# Patient Record
Sex: MALE | Race: WHITE | URBAN - METROPOLITAN AREA
[De-identification: names, ages, dates, MRNs, and addresses within clinical notes are randomized per-mention and may not be internally consistent; named-entity substitution may affect disease eponyms.]

---

## 2017-05-04 ENCOUNTER — OFFICE VISIT (OUTPATIENT)
Dept: INTERNAL MEDICINE | Facility: CLINIC | Age: 37
End: 2017-05-04
Payer: COMMERCIAL

## 2017-05-04 VITALS
HEART RATE: 82 BPM | OXYGEN SATURATION: 97 % | DIASTOLIC BLOOD PRESSURE: 84 MMHG | WEIGHT: 225 LBS | SYSTOLIC BLOOD PRESSURE: 126 MMHG | BODY MASS INDEX: 30.48 KG/M2 | HEIGHT: 72 IN | TEMPERATURE: 98.6 F

## 2017-05-04 DIAGNOSIS — R42 DIZZINESS: ICD-10-CM

## 2017-05-04 DIAGNOSIS — H81.11 BPPV (BENIGN PAROXYSMAL POSITIONAL VERTIGO), RIGHT: ICD-10-CM

## 2017-05-04 DIAGNOSIS — Z13.6 CARDIOVASCULAR SCREENING; LDL GOAL LESS THAN 130: ICD-10-CM

## 2017-05-04 DIAGNOSIS — M41.9 SCOLIOSIS, UNSPECIFIED SCOLIOSIS TYPE, UNSPECIFIED SPINAL REGION: ICD-10-CM

## 2017-05-04 DIAGNOSIS — Z00.01 ENCOUNTER FOR GENERAL ADULT MEDICAL EXAMINATION WITH ABNORMAL FINDINGS: Primary | ICD-10-CM

## 2017-05-04 LAB
ALBUMIN SERPL-MCNC: 4.3 G/DL (ref 3.4–5)
ALP SERPL-CCNC: 68 U/L (ref 40–150)
ALT SERPL W P-5'-P-CCNC: 37 U/L (ref 0–70)
ANION GAP SERPL CALCULATED.3IONS-SCNC: 8 MMOL/L (ref 3–14)
AST SERPL W P-5'-P-CCNC: 23 U/L (ref 0–45)
BASOPHILS # BLD AUTO: 0.1 10E9/L (ref 0–0.2)
BASOPHILS NFR BLD AUTO: 0.9 %
BILIRUB SERPL-MCNC: 0.5 MG/DL (ref 0.2–1.3)
BUN SERPL-MCNC: 17 MG/DL (ref 7–30)
CALCIUM SERPL-MCNC: 9.1 MG/DL (ref 8.5–10.1)
CHLORIDE SERPL-SCNC: 105 MMOL/L (ref 94–109)
CHOLEST SERPL-MCNC: 198 MG/DL
CO2 SERPL-SCNC: 27 MMOL/L (ref 20–32)
CREAT SERPL-MCNC: 1.15 MG/DL (ref 0.66–1.25)
DIFFERENTIAL METHOD BLD: NORMAL
EOSINOPHIL # BLD AUTO: 0.3 10E9/L (ref 0–0.7)
EOSINOPHIL NFR BLD AUTO: 4.6 %
ERYTHROCYTE [DISTWIDTH] IN BLOOD BY AUTOMATED COUNT: 13.2 % (ref 10–15)
GFR SERPL CREATININE-BSD FRML MDRD: 72 ML/MIN/1.7M2
GLUCOSE SERPL-MCNC: 92 MG/DL (ref 70–99)
HCT VFR BLD AUTO: 45.8 % (ref 40–53)
HDLC SERPL-MCNC: 60 MG/DL
HGB BLD-MCNC: 15.6 G/DL (ref 13.3–17.7)
LDLC SERPL CALC-MCNC: 118 MG/DL
LYMPHOCYTES # BLD AUTO: 1.9 10E9/L (ref 0.8–5.3)
LYMPHOCYTES NFR BLD AUTO: 29.2 %
MCH RBC QN AUTO: 30.2 PG (ref 26.5–33)
MCHC RBC AUTO-ENTMCNC: 34.1 G/DL (ref 31.5–36.5)
MCV RBC AUTO: 89 FL (ref 78–100)
MONOCYTES # BLD AUTO: 0.6 10E9/L (ref 0–1.3)
MONOCYTES NFR BLD AUTO: 9.9 %
NEUTROPHILS # BLD AUTO: 3.5 10E9/L (ref 1.6–8.3)
NEUTROPHILS NFR BLD AUTO: 55.4 %
NONHDLC SERPL-MCNC: 138 MG/DL
PLATELET # BLD AUTO: 293 10E9/L (ref 150–450)
POTASSIUM SERPL-SCNC: 4.2 MMOL/L (ref 3.4–5.3)
PROT SERPL-MCNC: 8 G/DL (ref 6.8–8.8)
RBC # BLD AUTO: 5.17 10E12/L (ref 4.4–5.9)
SODIUM SERPL-SCNC: 140 MMOL/L (ref 133–144)
TRIGL SERPL-MCNC: 102 MG/DL
TSH SERPL DL<=0.005 MIU/L-ACNC: 1.45 MU/L (ref 0.4–4)
VIT B12 SERPL-MCNC: 589 PG/ML (ref 193–986)
WBC # BLD AUTO: 6.4 10E9/L (ref 4–11)

## 2017-05-04 PROCEDURE — 99385 PREV VISIT NEW AGE 18-39: CPT | Performed by: NURSE PRACTITIONER

## 2017-05-04 PROCEDURE — 80061 LIPID PANEL: CPT | Performed by: NURSE PRACTITIONER

## 2017-05-04 PROCEDURE — 82306 VITAMIN D 25 HYDROXY: CPT | Performed by: NURSE PRACTITIONER

## 2017-05-04 PROCEDURE — 80050 GENERAL HEALTH PANEL: CPT | Performed by: NURSE PRACTITIONER

## 2017-05-04 PROCEDURE — 36415 COLL VENOUS BLD VENIPUNCTURE: CPT | Performed by: NURSE PRACTITIONER

## 2017-05-04 PROCEDURE — 82607 VITAMIN B-12: CPT | Performed by: NURSE PRACTITIONER

## 2017-05-04 ASSESSMENT — ANXIETY QUESTIONNAIRES
7. FEELING AFRAID AS IF SOMETHING AWFUL MIGHT HAPPEN: SEVERAL DAYS
3. WORRYING TOO MUCH ABOUT DIFFERENT THINGS: SEVERAL DAYS
5. BEING SO RESTLESS THAT IT IS HARD TO SIT STILL: NOT AT ALL
2. NOT BEING ABLE TO STOP OR CONTROL WORRYING: NOT AT ALL
1. FEELING NERVOUS, ANXIOUS, OR ON EDGE: NOT AT ALL
IF YOU CHECKED OFF ANY PROBLEMS ON THIS QUESTIONNAIRE, HOW DIFFICULT HAVE THESE PROBLEMS MADE IT FOR YOU TO DO YOUR WORK, TAKE CARE OF THINGS AT HOME, OR GET ALONG WITH OTHER PEOPLE: SOMEWHAT DIFFICULT
GAD7 TOTAL SCORE: 3
6. BECOMING EASILY ANNOYED OR IRRITABLE: SEVERAL DAYS

## 2017-05-04 ASSESSMENT — PATIENT HEALTH QUESTIONNAIRE - PHQ9: 5. POOR APPETITE OR OVEREATING: NOT AT ALL

## 2017-05-04 NOTE — PROGRESS NOTES
SUBJECTIVE:     CC: Bj Benitez is an 36 year old male who presents for preventative health visit.     Healthy Habits:    Do you get at least three servings of calcium containing foods daily (dairy, green leafy vegetables, etc.)? yes    Amount of exercise or daily activities, outside of work: 5 day(s) per week    Problems taking medications regularly not applicable    Medication side effects: No    Have you had an eye exam in the past two years? Yes x 2 months ago    Do you see a dentist twice per year? Yes x 2 weeks ago    Do you have sleep apnea, excessive snoring or daytime drowsiness?yes some snoring    Has had some dizziness with head turning   Sinus issues at times    PHQ-9 SCORE 5/4/2017   Total Score 5     GENIA-7 SCORE 5/4/2017   Total Score 3     Wife was concerned about his mood  He is a  and there were 2 specific issues that are now resolving he was dealing with. He feels ok now  Does not feel he needs any medication or counseling  Discussed both        Today's PHQ-2 Score:   PHQ-2 ( 1999 [employer]) 5/4/2017   Q1: Little interest or pleasure in doing things 0   Q2: Feeling down, depressed or hopeless 0   PHQ-2 Score 0       Abuse: Current or Past(Physical, Sexual or Emotional)- No  Do you feel safe in your environment - Yes    Social History   Substance Use Topics     Smoking status: Never Smoker     Smokeless tobacco: Not on file     Alcohol use No     The patient does not drink >3 drinks per day nor >7 drinks per week.    Last PSA: No results found for: PSA    No results for input(s): CHOL, HDL, LDL, TRIG, CHOLHDLRATIO, NHDL in the last 47358 hours.    Reviewed orders with patient. Reviewed health maintenance and updated orders accordingly - Yes    Reviewed and updated as needed this visit by clinical staff  Tobacco  Allergies  Meds  Problems  Med Hx  Surg Hx  Fam Hx  Soc Hx          Reviewed and updated as needed this visit by Provider  Allergies  Meds  Problems            ROS:  C:  "NEGATIVE for fever, chills, change in weight  I: NEGATIVE for worrisome rashes, moles or lesions  E: NEGATIVE for vision changes or irritation  ENT: NEGATIVE for ear, mouth and throat problems  R: NEGATIVE for significant cough or SOB  CV: NEGATIVE for chest pain, palpitations or peripheral edema  GI: NEGATIVE for nausea, abdominal pain, heartburn, or change in bowel habits   male: negative for dysuria, hematuria, decreased urinary stream, erectile dysfunction, urethral discharge  M: NEGATIVE for significant arthralgias or myalgia  N: NEGATIVE for weakness, dizziness or paresthesias  P: NEGATIVE for changes in mood or affect    Problem list, Medication list, Allergies, and Medical/Social/Surgical histories reviewed in [facility] and updated as appropriate.  OBJECTIVE:     /84 (BP Location: Right arm, Patient Position: Chair, Cuff Size: Adult Large)  Pulse 82  Temp 98.6  F (37  C) (Oral)  Ht 5' 11.5\" (1.816 m)  Wt 225 lb (102.1 kg)  SpO2 97%  BMI 30.94 kg/m2  EXAM:  GENERAL: alert and no distress  EYES: Eyes grossly normal to inspection,  and conjunctivae and sclerae normal  HENT: ear canals and TM's normal on left and dull on right ; with head turn to right has some mild dizziness , nose and mouth without ulcers or lesions  NECK: no adenopathy, no asymmetry, masses, or scars and thyroid normal to palpation  RESP: lungs clear to auscultation - no rales, rhonchi or wheezes  CV: regular rate and rhythm, normal S1 S2, no S3 or S4, no murmur, click or rub, no peripheral edema and peripheral pulses strong  ABDOMEN: soft, nontender, no hepatosplenomegaly, no masses and bowel sounds normal   (male): normal male genitalia without lesions or urethral discharge, no hernia  MS: scoliosis of back; not new   SKIN: no suspicious lesions or rashes  NEURO: Normal strength and tone, mentation intact and speech normal  PSYCH: mentation appears normal, affect normal/bright    ASSESSMENT/PLAN:         ICD-10-CM    1. Encounter " "for general adult medical examination with abnormal findings Z00.01 Lipid panel reflex to direct LDL     Comprehensive metabolic panel     TSH with free T4 reflex     CBC with platelets differential     Vitamin D Deficiency     Vitamin D Deficiency   2. Dizziness R42 TSH with free T4 reflex     CBC with platelets differential     Vitamin B12     Vitamin D Deficiency     Vitamin D Deficiency   3. BPPV (benign paroxysmal positional vertigo), right H81.11    4. Scoliosis, unspecified scoliosis type, unspecified spinal region M41.9    5. CARDIOVASCULAR SCREENING; LDL GOAL LESS THAN 130 Z13.6        COUNSELING:  Reviewed preventive health counseling, as reflected in patient instructions       Regular exercise       Healthy diet/nutrition       Osteoporosis Prevention/Bone Health         reports that he has never smoked. He does not have any smokeless tobacco history on file.    Estimated body mass index is 30.94 kg/(m^2) as calculated from the following:    Height as of this encounter: 5' 11.5\" (1.816 m).    Weight as of this encounter: 225 lb (102.1 kg).   Weight management plan: Discussed healthy diet and exercise guidelines and patient will follow up in 12 months in clinic to re-evaluate.    Counseling Resources:  ATP IV Guidelines  Pooled Cohorts Equation Calculator  FRAX Risk Assessment  ICSI Preventive Guidelines  Dietary Guidelines for Americans, 2010  USDA's MyPlate  ASA Prophylaxis  Lung CA Screening    MAGY Meza [facility]  "

## 2017-05-04 NOTE — NURSING NOTE
"Chief Complaint   Patient presents with     Physical     new pt, fasting       Initial /84 (BP Location: Right arm, Patient Position: Chair, Cuff Size: Adult Large)  Pulse 82  Temp 98.6  F (37  C) (Oral)  Ht 5' 11.5\" (1.816 m)  Wt 225 lb (102.1 kg)  SpO2 97%  BMI 30.94 kg/m2 Estimated body mass index is 30.94 kg/(m^2) as calculated from the following:    Height as of this encounter: 5' 11.5\" (1.816 m).    Weight as of this encounter: 225 lb (102.1 kg).  Medication Reconciliation: complete    "

## 2017-05-04 NOTE — MR AVS SNAPSHOT
After Visit Summary   5/4/2017    Bj Benitez    MRN: [id number]           Patient Information     Date Of Birth          1980        Visit Information        Provider Department      5/4/2017 7:40 AM Floresita Narayanan APRN [facility]        Today's Diagnoses     Encounter for general adult medical examination with abnormal findings    -  1    Dizziness          Care Instructions    Labs in [address]    Be careful with head turn to right     Meclizine 12.5-25 mg every 6 hours as needed for dizziness      If symptoms persist would have you do vestibular rehab     Preventive Health Recommendations  Male Ages 26 - 39    Yearly exam:             See your health care provider every year in order to  o   Review health changes.   o   Discuss preventive care.    o   Review your medicines if your doctor has prescribed any.    You should be tested each year for STDs (sexually transmitted diseases), if you re at risk.     After age 35, talk to your provider about cholesterol testing. If you are at risk for heart disease, have your cholesterol tested at least every 5 years.     If you are at risk for diabetes, you should have a diabetes test (fasting glucose).  Shots: Get a flu shot each year. Get a tetanus shot every 10 years.     Nutrition:    Eat at least 5 servings of fruits and vegetables daily.     Eat whole-grain bread, whole-wheat pasta and brown rice instead of white grains and rice.     Talk to your provider about Calcium and Vitamin D.     Lifestyle    Exercise for at least 150 minutes a week (30 minutes a day, 5 days a week). This will help you control your weight and prevent disease.     Limit alcohol to one drink per day.     No smoking.     Wear sunscreen to prevent skin cancer.     See your dentist every six months for an exam and cleaning.           Follow-ups after your visit        Future tests that were ordered for you today     Open Future Orders        Priority  "Expected Expires Ordered    Vitamin D Deficiency Routine  2018            Who to contact     If you have questions or need follow up information about today's clinic visit or your schedule please contact [facility] directly at [phone].  Normal or non-critical lab and imaging results will be communicated to you by MyChart, letter or phone within 4 business days after the clinic has received the results. If you do not hear from us within 7 days, please contact the clinic through [employer] or phone. If you have a critical or abnormal lab result, we will notify you by phone as soon as possible.  Submit refill requests through [employer] or call your pharmacy and they will forward the refill request to us. Please allow 3 business days for your refill to be completed.          Additional Information About Your Visit        My[employer]See[employer] Information     [employer] lets you send messages to your doctor, view your test results, renew your prescriptions, schedule appointments and more. To sign up, go to [email or web address] . Click on \"Log in\" on the left side of the screen, which will take you to the Welcome page. Then click on \"Sign up Now\" on the right side of the page.     You will be asked to enter the access code listed below, as well as some personal information. Please follow the directions to create your username and password.     Your access code is: [email or web address]  Expires: 2017  9:07 AM     Your access code will  in 90 days. If you need help or a new code, please call your [facility] clinic or [phone].        Care EveryWhere ID     This is your Care EveryWhere ID. This could be used by other organizations to access your [facility] medical records  [id number]        Your Vitals Were     Pulse Temperature Height Pulse Oximetry BMI (Body Mass Index)       82 98.6  F (37  C) (Oral) 5' 11.5\" (1.816 m) 97% 30.94 kg/m2        Blood Pressure from Last 3 Encounters:   17 126/84    " Weight from Last 3 Encounters:   05/04/17 225 lb (102.1 kg)              We Performed the Following     CBC with platelets differential     Comprehensive metabolic panel     Lipid panel reflex to direct LDL     TSH with free T4 reflex     Vitamin B12     Vitamin D Deficiency        Primary Care Provider    Physician No Ref-Primary       No address on file        Thank you!     Thank you for choosing [facility]  for your care. Our goal is always to provide you with excellent care. Hearing back from our patients is one way we can continue to improve our services. Please take a few minutes to complete the written survey that you may receive in the mail after your visit with us. Thank you!             Your Updated Medication List - Protect others around you: Learn how to safely use, store and throw away your medicines at www.disposemymeds.org.      Notice  As of 5/4/2017  9:07 AM    You have not been prescribed any medications.

## 2017-05-04 NOTE — PATIENT INSTRUCTIONS
Labs in [address]    Be careful with head turn to right     Meclizine 12.5-25 mg every 6 hours as needed for dizziness      If symptoms persist would have you do vestibular rehab     Preventive Health Recommendations  Male Ages 26 - 39    Yearly exam:             See your health care provider every year in order to  o   Review health changes.   o   Discuss preventive care.    o   Review your medicines if your doctor has prescribed any.    You should be tested each year for STDs (sexually transmitted diseases), if you re at risk.     After age 35, talk to your provider about cholesterol testing. If you are at risk for heart disease, have your cholesterol tested at least every 5 years.     If you are at risk for diabetes, you should have a diabetes test (fasting glucose).  Shots: Get a flu shot each year. Get a tetanus shot every 10 years.     Nutrition:    Eat at least 5 servings of fruits and vegetables daily.     Eat whole-grain bread, whole-wheat pasta and brown rice instead of white grains and rice.     Talk to your provider about Calcium and Vitamin D.     Lifestyle    Exercise for at least 150 minutes a week (30 minutes a day, 5 days a week). This will help you control your weight and prevent disease.     Limit alcohol to one drink per day.     No smoking.     Wear sunscreen to prevent skin cancer.     See your dentist every six months for an exam and cleaning.

## 2017-05-05 LAB — DEPRECATED CALCIDIOL+CALCIFEROL SERPL-MC: 23 UG/L (ref 20–75)

## 2017-05-05 ASSESSMENT — PATIENT HEALTH QUESTIONNAIRE - PHQ9: SUM OF ALL RESPONSES TO PHQ QUESTIONS 1-9: 5

## 2017-05-05 ASSESSMENT — ANXIETY QUESTIONNAIRES: GAD7 TOTAL SCORE: 3

## 2017-12-05 ENCOUNTER — HOSPITAL ENCOUNTER (EMERGENCY)
Facility: CLINIC | Age: 37
Discharge: HOME | End: 2017-12-05
Attending: INTERNAL MEDICINE | Admitting: INTERNAL MEDICINE
Payer: COMMERCIAL

## 2017-12-05 VITALS
OXYGEN SATURATION: 100 % | SYSTOLIC BLOOD PRESSURE: 115 MMHG | WEIGHT: 220 LBS | DIASTOLIC BLOOD PRESSURE: 73 MMHG | TEMPERATURE: 97.6 F | BODY MASS INDEX: 30.26 KG/M2 | RESPIRATION RATE: 18 BRPM

## 2017-12-05 DIAGNOSIS — F41.0 ANXIETY ATTACK: ICD-10-CM

## 2017-12-05 LAB
ANION GAP SERPL CALCULATED.3IONS-SCNC: 6 MMOL/L (ref 3–14)
BASOPHILS # BLD AUTO: 0.1 10E9/L (ref 0–0.2)
BASOPHILS NFR BLD AUTO: 1.1 %
BUN SERPL-MCNC: 15 MG/DL (ref 7–30)
CALCIUM SERPL-MCNC: 8.8 MG/DL (ref 8.5–10.1)
CHLORIDE SERPL-SCNC: 104 MMOL/L (ref 94–109)
CO2 SERPL-SCNC: 28 MMOL/L (ref 20–32)
CREAT SERPL-MCNC: 1.19 MG/DL (ref 0.66–1.25)
DIFFERENTIAL METHOD BLD: NORMAL
EOSINOPHIL # BLD AUTO: 0.3 10E9/L (ref 0–0.7)
EOSINOPHIL NFR BLD AUTO: 4.7 %
ERYTHROCYTE [DISTWIDTH] IN BLOOD BY AUTOMATED COUNT: 12.6 % (ref 10–15)
GFR SERPL CREATININE-BSD FRML MDRD: 69 ML/MIN/1.7M2
GLUCOSE SERPL-MCNC: 106 MG/DL (ref 70–99)
HCT VFR BLD AUTO: 42.9 % (ref 40–53)
HGB BLD-MCNC: 14.7 G/DL (ref 13.3–17.7)
IMM GRANULOCYTES # BLD: 0 10E9/L (ref 0–0.4)
IMM GRANULOCYTES NFR BLD: 0.3 %
INTERPRETATION ECG - MUSE: NORMAL
LYMPHOCYTES # BLD AUTO: 1.8 10E9/L (ref 0.8–5.3)
LYMPHOCYTES NFR BLD AUTO: 28.1 %
MCH RBC QN AUTO: 29.4 PG (ref 26.5–33)
MCHC RBC AUTO-ENTMCNC: 34.3 G/DL (ref 31.5–36.5)
MCV RBC AUTO: 86 FL (ref 78–100)
MONOCYTES # BLD AUTO: 0.8 10E9/L (ref 0–1.3)
MONOCYTES NFR BLD AUTO: 11.9 %
NEUTROPHILS # BLD AUTO: 3.4 10E9/L (ref 1.6–8.3)
NEUTROPHILS NFR BLD AUTO: 53.9 %
NRBC # BLD AUTO: 0 10*3/UL
NRBC BLD AUTO-RTO: 0 /100
PLATELET # BLD AUTO: 295 10E9/L (ref 150–450)
POTASSIUM SERPL-SCNC: 3.4 MMOL/L (ref 3.4–5.3)
RBC # BLD AUTO: 5 10E12/L (ref 4.4–5.9)
SODIUM SERPL-SCNC: 138 MMOL/L (ref 133–144)
TROPONIN I SERPL-MCNC: <0.015 UG/L (ref 0–0.04)
TSH SERPL DL<=0.005 MIU/L-ACNC: 2.4 MU/L (ref 0.4–4)
WBC # BLD AUTO: 6.4 10E9/L (ref 4–11)

## 2017-12-05 PROCEDURE — 93005 ELECTROCARDIOGRAM TRACING: CPT

## 2017-12-05 PROCEDURE — 25000128 H RX IP 250 OP 636: Performed by: INTERNAL MEDICINE

## 2017-12-05 PROCEDURE — 80048 BASIC METABOLIC PNL TOTAL CA: CPT | Performed by: INTERNAL MEDICINE

## 2017-12-05 PROCEDURE — 85025 COMPLETE CBC W/AUTO DIFF WBC: CPT | Performed by: INTERNAL MEDICINE

## 2017-12-05 PROCEDURE — 84484 ASSAY OF TROPONIN QUANT: CPT | Performed by: INTERNAL MEDICINE

## 2017-12-05 PROCEDURE — 96374 THER/PROPH/DIAG INJ IV PUSH: CPT

## 2017-12-05 PROCEDURE — 96376 TX/PRO/DX INJ SAME DRUG ADON: CPT

## 2017-12-05 PROCEDURE — 99285 EMERGENCY DEPT VISIT HI MDM: CPT | Mod: 25

## 2017-12-05 PROCEDURE — 84443 ASSAY THYROID STIM HORMONE: CPT | Performed by: INTERNAL MEDICINE

## 2017-12-05 RX ORDER — LORAZEPAM 2 MG/ML
1 INJECTION INTRAMUSCULAR ONCE
Status: COMPLETED | OUTPATIENT
Start: 2017-12-05 | End: 2017-12-05

## 2017-12-05 RX ORDER — LORAZEPAM 1 MG/1
1 TABLET ORAL EVERY 8 HOURS PRN
Qty: 20 TABLET | Refills: 0 | Status: SHIPPED | OUTPATIENT
Start: 2017-12-05 | End: 2018-01-19

## 2017-12-05 RX ADMIN — LORAZEPAM 1 MG: 2 INJECTION INTRAMUSCULAR; INTRAVENOUS at 03:28

## 2017-12-05 RX ADMIN — LORAZEPAM 1 MG: 2 INJECTION INTRAMUSCULAR; INTRAVENOUS at 04:14

## 2017-12-05 ASSESSMENT — ENCOUNTER SYMPTOMS
SHORTNESS OF BREATH: 0
COUGH: 0
NAUSEA: 1
NERVOUS/ANXIOUS: 1
DIAPHORESIS: 1

## 2017-12-05 NOTE — DISCHARGE INSTRUCTIONS
Discharge Instructions  Mental Health Concerns    You were seen today for mental health concerns, such as depression, severe anxiety, or suicidal thinking. Your doctor feels that you do not require hospitalization at this time. However, your symptoms may become worse, and you may need to return to the Emergency Department. Most treatments of depression and suicidal thoughts are a process rather than a single intervention.  Medications and counseling can take several weeks or more to help.  It is important to follow up as discussed with your family doctor or counselor.      By accepting these discharge instructions:    You promise to not harm yourself or others.    You agree that if you feel you are becoming unable to keep that promise, you will do something to help yourself before you do anything to harm yourself or others.     You agree to keep any safety plan arranged on your visit here today.    You agree to take any medication prescribed or recommended by your doctor.    If you are getting worse, you can contact a friend or a family member, contact your counselor or family doctor, contact a crisis line, or other options discussed with the doctor or therapist today.    At any time, you can call 911 and return to the emergency department for more help.    You understand that follow-up is essential to your treatment, and you will make and keep appointments recommended on your visit today.    How to improve your mental health and prevent suicide:    Involve others by letting family, friends, counselors know.  Do not isolate yourself.    Avoid alcohol or drugs. Remove weapons, poisons from your home.    Try to stick to routines for eating, sleeping and getting regular exercise.      Try to get into sunlight. Bright natural light not only treats seasonal affective disorder but also depression.    Increase safe activities that you enjoy.    If you feel worse, contact [phone], or call 911, or your family  doctor/counselor for additional assistance.    If you were given a prescription for medicine here today, be sure to read all of the information (including the package insert) that comes with your prescription.  This will include important information about the medicine, its side effects, and any warnings that you need to know about.  The pharmacist who fills the prescription can provide more information and answer questions you may have about the medicine.  If you have questions or concerns that the pharmacist cannot address, please call or return to the Emergency Department.         Understanding Anxiety Disorders  Almost everyone gets nervous now and then. It s normal to have knots in your stomach before a test, or for your heart to race on a first date. But an anxiety disorder is much more than a case of nerves. In fact, its symptoms may be overwhelming. But treatment can relieve many of these symptoms. Talking to your healthcare provider is the first step.    What are anxiety disorders?  An anxiety disorder causes intense feelings of panic and fear. These feelings may arise for no apparent reason. And they tend to recur again and again. They may prevent you from coping with life and cause you great distress. As a result, you may avoid anything that triggers your fear. In extreme cases, you may never leave the house. Anxiety disorders may cause other symptoms, such as:    Obsessive thoughts you can t control    Constant nightmares or painful thoughts of the past    Nausea, sweating, and muscle tension    Trouble sleeping or concentrating  What causes anxiety disorders?  Anxiety disorders tend to run in families. For some people, childhood abuse or neglect may play a role. For others, stressful life events or trauma may trigger anxiety disorders. Anxiety can trigger low self-esteem and poor coping skills.  Common anxiety disorders    Panic disorder. This causes an intense fear of being in danger.    Phobias. These  are extreme fears of certain objects, places, or events.    Obsessive-compulsive disorder. This causes you to have unwanted thoughts and urges. You also may perform certain actions over and over.    Posttraumatic stress disorder. This occurs in people who have survived a terrible ordeal. It can cause nightmares and flashbacks about the event.    Generalized anxiety disorder. This causes constant worry that can greatly disrupt your life.   Getting better  You may believe that nothing can help you. Or, you might fear what others may think. But most anxiety symptoms can be eased. Having an anxiety disorder is nothing to be ashamed of. Most people do best with treatment that combines medicine and therapy. These aren t cures. But they can help you live a healthier life.  Date Last Reviewed: 2/1/2017 2000-2017 [employer]. [address] [place] [address], [place] [address]. All rights reserved. This information is not intended as a substitute for professional medical care. Always follow your healthcare professional's instructions.        Remember that you can always come back to the Emergency Department if you are not able to see your regular doctor in the amount of time listed above, if you get any new symptoms, or if there is anything that worries you.

## 2017-12-05 NOTE — ED PROVIDER NOTES
History     Chief Complaint:  Panic Attack    HPI   Bj Benitez is a 37 year old male who presents to the emergency department today for evaluation of a panic attack. The patient states that he recently took a few days off to take care of his mother-in-law in [place] who has stage 4 cancer, and has been generally stressed over the past few days. The patient then reports an upset stomach and having to use his inhaler yesterday. Today, the patient woke up from sleeping next to his wife feeling fine initially, but then suddenly began to have racing thoughts with feelings of panic and anxiety and was sweating, unable to get up. Here, he reports persistent anxiety, but notes that he is calmer as he has been talking with staff and keeping his mind on other things. Of note, the patient normally drinks one cup of coffee per day, occasionally uses his inhaler with noticeable increases in heart rate, and has a family history of anxiety and depression. He denies any leg pain or swelling, chest pain, hemoptysis, or shortness of breath.    Allergies:  No Known Drug Allergies    Medications:    The patient is currently on no regular medications.      Past Medical History:    Scoliosis    Past Surgical History:    Tonsillectomy    Family History:    Anxiety  Depression  MI  Hypertension    Social History:  The patient was accompanied to the ED alone.  Smoking Status: Never smoker  Smokeless Tobacco: Never used  Alcohol Use: No  Marital Status:   [2]     Review of Systems   Constitutional: Positive for diaphoresis.   Respiratory: Negative for cough and shortness of breath.    Cardiovascular: Negative for chest pain.   Gastrointestinal: Positive for nausea.   Psychiatric/Behavioral: The patient is nervous/anxious.    All other systems reviewed and are negative.    Physical Exam   First Vitals:  BP: (!) 165/97  Heart Rate: 77  Temp: 97.6  F (36.4  C)  Resp: 24  Weight: 99.8 kg (220 lb)  SpO2: 100 %    Physical Exam    Constitutional: He is cooperative.   HENT:   Right Ear: Tympanic membrane normal.   Left Ear: Tympanic membrane normal.   Mouth/Throat: Oropharynx is clear and moist and mucous membranes are normal.   Eyes: Conjunctivae are normal.   Neck: Normal range of motion.   Cardiovascular: Regular rhythm and normal heart sounds.    Pulmonary/Chest: Effort normal and breath sounds normal.   Abdominal: Soft. Normal appearance and bowel sounds are normal. There is no rebound and no guarding.   Musculoskeletal: Normal range of motion.   Lymphadenopathy:     He has no cervical adenopathy.   Neurological: He is alert.   Skin: Skin is warm and dry.   Psychiatric: His mood appears anxious.       Emergency Department Course     ECG:  ECG taken at 0336, ECG read at 0336  Normal sinus rhythm  Septal infarct, age undetermined  Abnormal ECG  Rate 83 bpm. FL interval 164. QRS duration 104. QT/QTc 388/455. P-R-T axes 47 29 23.    Laboratory:  Laboratory findings were communicated with the patient who voiced understanding of the findings.    CBC: AWNL. (WBC 6.4, HGB 14.7, )   BMP: Glucose 106 (H) o/w WNL (Creatinine 1.19)  Troponin (Collected 0320): <0.015  TSH with free T4 reflex: 2.40    Interventions:  0328 Ativan 1 mg IV  0414 Ativan 1 mg IV    Emergency Department Course:  Nursing notes and vitals reviewed.  I performed an exam of the patient as documented above.   IV was inserted and blood was drawn for laboratory testing, results above.  At 0437 the patient was rechecked and was updated on the results of laboratory studies.   I discussed the treatment plan with the patient. He expressed understanding of this plan and consented to discharge. He will be discharged home with instructions for care and follow up. In addition, the patient will return to the emergency department if their symptoms worsen, if new symptoms arise or if there is any concern.  All questions were answered.  I personally reviewed the laboratory results  with the patient and answered all related questions prior to discharge.    Impression & Plan      Medical Decision Making:  Bj Benitez is a 37 year old male who presents to the emergency department complaining of feelings of panic and anxiety. Because he has not carried this diagnosis, I considered a broad differential including infectious or metabolic disorders, cardiac disease, or endocrine disorders. Evaluation here however is negative. After Ativan, he is feeling improved. I think this does represent anxiety given his symptoms. I will discharge him with Ativan to use as needed. Close follow up with primary care, return if problems.    Diagnosis:    ICD-10-CM    1. Anxiety attack F41.0      Disposition:   Home    Discharge Medications:  New Prescriptions    LORAZEPAM (ATIVAN) 1 MG TABLET    Take 1 tablet (1 mg) by mouth every 8 hours as needed for anxiety     Scribe Disclosure:  I, Belem Manuel, am serving as a scribe at 3:04 AM on 12/5/2017 to document services personally performed by Darline Ambriz MD, based on my observations and the provider's statements to me.    12/5/2017   [facility] EMERGENCY DEPARTMENT       Darline Ambriz MD  12/05/17 0649

## 2017-12-05 NOTE — ED NOTES
Patient c/o racing thoughts and hyperventilating. Sx woke him up. Patient has had some anxiety in the past. Unable to stop having the racing thoughts.

## 2017-12-05 NOTE — ED AVS SNAPSHOT
[facility] Emergency Department    [address]    [facility] [address]    Phone:  [phone]    Fax:  [phone]                                       Bj Benitez   MRN: [id number]    Department:  [facility] Emergency Department   Date of Visit:  12/5/2017           After Visit Summary Signature Page     I have received my discharge instructions, and my questions have been answered. I have discussed any challenges I see with this plan with the nurse or doctor.    ..........................................................................................................................................  Patient/Patient Representative Signature      ..........................................................................................................................................  Patient Representative Print Name and Relationship to Patient    ..................................................               ................................................  Date                                            Time    ..........................................................................................................................................  Reviewed by Signature/Title    ...................................................              ..............................................  Date                                                            Time

## 2017-12-05 NOTE — ED AVS SNAPSHOT
[facility] Emergency Department    [address] [place] [address]    Phone:  [phone]    Fax:  [phone]                                       Bj Benitez   MRN: [id number]    Department:  [facility] Emergency Department   Date of Visit:  12/5/2017           Patient Information     Date Of Birth          1980        Your diagnoses for this visit were:     Anxiety attack        You were seen by Darline Ambriz MD.        Discharge Instructions         Discharge Instructions  Mental Health Concerns    You were seen today for mental health concerns, such as depression, severe anxiety, or suicidal thinking. Your doctor feels that you do not require hospitalization at this time. However, your symptoms may become worse, and you may need to return to the Emergency Department. Most treatments of depression and suicidal thoughts are a process rather than a single intervention.  Medications and counseling can take several weeks or more to help.  It is important to follow up as discussed with your family doctor or counselor.      By accepting these discharge instructions:    You promise to not harm yourself or others.    You agree that if you feel you are becoming unable to keep that promise, you will do something to help yourself before you do anything to harm yourself or others.     You agree to keep any safety plan arranged on your visit here today.    You agree to take any medication prescribed or recommended by your doctor.    If you are getting worse, you can contact a friend or a family member, contact your counselor or family doctor, contact a crisis line, or other options discussed with the doctor or therapist today.    At any time, you can call 911 and return to the emergency department for more help.    You understand that follow-up is essential to your treatment, and you will make and keep appointments recommended on your visit today.    How to  improve your mental health and prevent suicide:    Involve others by letting family, friends, counselors know.  Do not isolate yourself.    Avoid alcohol or drugs. Remove weapons, poisons from your home.    Try to stick to routines for eating, sleeping and getting regular exercise.      Try to get into sunlight. Bright natural light not only treats seasonal affective disorder but also depression.    Increase safe activities that you enjoy.    If you feel worse, contact [phone], or call 911, or your family doctor/counselor for additional assistance.    If you were given a prescription for medicine here today, be sure to read all of the information (including the package insert) that comes with your prescription.  This will include important information about the medicine, its side effects, and any warnings that you need to know about.  The pharmacist who fills the prescription can provide more information and answer questions you may have about the medicine.  If you have questions or concerns that the pharmacist cannot address, please call or return to the Emergency Department.         Understanding Anxiety Disorders  Almost everyone gets nervous now and then. It s normal to have knots in your stomach before a test, or for your heart to race on a first date. But an anxiety disorder is much more than a case of nerves. In fact, its symptoms may be overwhelming. But treatment can relieve many of these symptoms. Talking to your healthcare provider is the first step.    What are anxiety disorders?  An anxiety disorder causes intense feelings of panic and fear. These feelings may arise for no apparent reason. And they tend to recur again and again. They may prevent you from coping with life and cause you great distress. As a result, you may avoid anything that triggers your fear. In extreme cases, you may never leave the house. Anxiety disorders may cause other symptoms, such as:    Obsessive thoughts you can t  control    Constant nightmares or painful thoughts of the past    Nausea, sweating, and muscle tension    Trouble sleeping or concentrating  What causes anxiety disorders?  Anxiety disorders tend to run in families. For some people, childhood abuse or neglect may play a role. For others, stressful life events or trauma may trigger anxiety disorders. Anxiety can trigger low self-esteem and poor coping skills.  Common anxiety disorders    Panic disorder. This causes an intense fear of being in danger.    Phobias. These are extreme fears of certain objects, places, or events.    Obsessive-compulsive disorder. This causes you to have unwanted thoughts and urges. You also may perform certain actions over and over.    Posttraumatic stress disorder. This occurs in people who have survived a terrible ordeal. It can cause nightmares and flashbacks about the event.    Generalized anxiety disorder. This causes constant worry that can greatly disrupt your life.   Getting better  You may believe that nothing can help you. Or, you might fear what others may think. But most anxiety symptoms can be eased. Having an anxiety disorder is nothing to be ashamed of. Most people do best with treatment that combines medicine and therapy. These aren t cures. But they can help you live a healthier life.  Date Last Reviewed: 2/1/2017 2000-2017 [employer] [address] [place] [address], [place] [address]. All rights reserved. This information is not intended as a substitute for professional medical care. Always follow your healthcare professional's instructions.        Remember that you can always come back to the Emergency Department if you are not able to see your regular doctor in the amount of time listed above, if you get any new symptoms, or if there is anything that worries you.      24 Hour Appointment Hotline       To make an appointment at any [facility] call [phone] ([phone]). If you don't have a  family doctor or clinic, we will help you find one. [facility] are conveniently located to serve the needs of you and your family.             Review of your medicines      START taking        Dose / Directions Last dose taken    LORazepam 1 MG tablet   Commonly known as:  ATIVAN   Dose:  1 mg   Quantity:  20 tablet        Take 1 tablet (1 mg) by mouth every 8 hours as needed for anxiety   Refills:  0                Prescriptions were sent or printed at these locations (1 Prescription)                   Other Prescriptions                Printed at Department/Unit printer (1 of 1)         LORazepam (ATIVAN) 1 MG tablet                Procedures and tests performed during your visit     Basic metabolic panel    CBC with platelets differential    EKG 12-lead, tracing only    Peripheral IV: Standard    TSH with free T4 reflex    Troponin I      Orders Needing Specimen Collection     None      Pending Results     Date and Time Order Name Status Description    12/5/2017 0312 EKG 12-lead, tracing only Preliminary             Pending Culture Results     No orders found from 12/3/2017 to 12/6/2017.            Pending Results Instructions     If you had any lab results that were not finalized at the time of your Discharge, you can call the ED Lab Result RN at [phone]. You will be contacted by this team for any positive Lab results or changes in treatment. The nurses are available 7 days a week from 10A to 6:30P.  You can leave a message 24 hours per day and they will return your call.        Test Results From Your Hospital Stay        12/5/2017  3:33 AM      Component Results     Component Value Ref Range & Units Status    WBC 6.4 4.0 - 11.0 10e9/L Final    RBC Count 5.00 4.4 - 5.9 10e12/L Final    Hemoglobin 14.7 13.3 - 17.7 g/dL Final    Hematocrit 42.9 40.0 - 53.0 % Final    MCV 86 78 - 100 fl Final    MCH 29.4 26.5 - 33.0 pg Final    MCHC 34.3 31.5 - 36.5 g/dL Final    RDW 12.6 10.0 - 15.0 % Final    Platelet  Count 295 150 - 450 10e9/L Final    Diff Method Automated Method  Final    % Neutrophils 53.9 % Final    % Lymphocytes 28.1 % Final    % Monocytes 11.9 % Final    % Eosinophils 4.7 % Final    % Basophils 1.1 % Final    % Immature Granulocytes 0.3 % Final    Nucleated RBCs 0 0 /100 Final    Absolute Neutrophil 3.4 1.6 - 8.3 10e9/L Final    Absolute Lymphocytes 1.8 0.8 - 5.3 10e9/L Final    Absolute Monocytes 0.8 0.0 - 1.3 10e9/L Final    Absolute Eosinophils 0.3 0.0 - 0.7 10e9/L Final    Absolute Basophils 0.1 0.0 - 0.2 10e9/L Final    Abs Immature Granulocytes 0.0 0 - 0.4 10e9/L Final    Absolute Nucleated RBC 0.0  Final         12/5/2017  3:53 AM      Component Results     Component Value Ref Range & Units Status    Sodium 138 133 - 144 mmol/L Final    Potassium 3.4 3.4 - 5.3 mmol/L Final    Chloride 104 94 - 109 mmol/L Final    Carbon Dioxide 28 20 - 32 mmol/L Final    Anion Gap 6 3 - 14 mmol/L Final    Glucose 106 (H) 70 - 99 mg/dL Final    Urea Nitrogen 15 7 - 30 mg/dL Final    Creatinine 1.19 0.66 - 1.25 mg/dL Final    GFR Estimate 69 >60 mL/min/1.7m2 Final    Non  GFR Calc    GFR Estimate If Black 83 >60 mL/min/1.7m2 Final    African American GFR Calc    Calcium 8.8 8.5 - 10.1 mg/dL Final         12/5/2017  3:53 AM      Component Results     Component Value Ref Range & Units Status    Troponin I ES <0.015 0.000 - 0.045 ug/L Final    The 99th percentile for upper reference range is 0.045 ug/L.  Troponin values   in the range of 0.045 - 0.120 ug/L may be associated with risks of adverse   clinical events.           12/5/2017  3:57 AM      Component Results     Component Value Ref Range & Units Status    TSH 2.40 0.40 - 4.00 mU/L Final                Clinical Quality Measure: Blood Pressure Screening     Your blood pressure was checked while you were in the emergency department today. The last reading we obtained was  BP: 115/73 . Please read the guidelines below about what these numbers mean and  what you should do about them.  If your systolic blood pressure (the top number) is less than 120 and your diastolic blood pressure (the bottom number) is less than 80, then your blood pressure is normal. There is nothing more that you need to do about it.  If your systolic blood pressure (the top number) is 120-139 or your diastolic blood pressure (the bottom number) is 80-89, your blood pressure may be higher than it should be. You should have your blood pressure rechecked within a year by a primary care provider.  If your systolic blood pressure (the top number) is 140 or greater or your diastolic blood pressure (the bottom number) is 90 or greater, you may have high blood pressure. High blood pressure is treatable, but if left untreated over time it can put you at risk for heart attack, stroke, or kidney failure. You should have your blood pressure rechecked by a primary care provider within the next 4 weeks.  If your provider in the emergency department today gave you specific instructions to follow-up with your doctor or provider even sooner than that, you should follow that instruction and not wait for up to 4 weeks for your follow-up visit.        Thank you for choosing [facility]       Thank you for choosing [facility] for your care. Our goal is always to provide you with excellent care. Hearing back from our patients is one way we can continue to improve our services. Please take a few minutes to complete the written survey that you may receive in the mail after you visit with us. Thank you!        [facility] Information     [facility] gives you secure access to your electronic health record. If you see a primary care provider, you can also send messages to your care team and make appointments. If you have questions, please call your primary care clinic.  If you do not have a primary care provider, please call [phone] and they will assist you.        Care EveryWhere ID     This is your Care EveryWhere ID. This  could be used by other organizations to access your [facility] medical records  [id number]        Equal Access to Services     STEPHANI ARRINGTON : Hadii matt Mendoza, gregg jovel, diamond fitzgerald. So [facility] [phone].    ATENCIÓN: Si habla español, tiene a arizmendi disposición servicios gratuitos de asistencia lingüística. Llame al [phone].    We comply with applicable federal civil rights laws and [place] laws. We do not discriminate on the basis of race, color, national origin, age, disability, sex, sexual orientation, or gender identity.            After Visit Summary       This is your record. Keep this with you and show to your community pharmacist(s) and doctor(s) at your next visit.

## 2017-12-06 ENCOUNTER — OFFICE VISIT (OUTPATIENT)
Dept: INTERNAL MEDICINE | Facility: CLINIC | Age: 37
End: 2017-12-06
Payer: COMMERCIAL

## 2017-12-06 VITALS
BODY MASS INDEX: 30.76 KG/M2 | WEIGHT: 227.1 LBS | OXYGEN SATURATION: 98 % | DIASTOLIC BLOOD PRESSURE: 96 MMHG | HEART RATE: 91 BPM | RESPIRATION RATE: 16 BRPM | HEIGHT: 72 IN | TEMPERATURE: 97.5 F | SYSTOLIC BLOOD PRESSURE: 120 MMHG

## 2017-12-06 DIAGNOSIS — F41.1 GAD (GENERALIZED ANXIETY DISORDER): Primary | ICD-10-CM

## 2017-12-06 DIAGNOSIS — F43.21 ADJUSTMENT DISORDER WITH DEPRESSED MOOD: ICD-10-CM

## 2017-12-06 PROCEDURE — 99214 OFFICE O/P EST MOD 30 MIN: CPT | Performed by: NURSE PRACTITIONER

## 2017-12-06 ASSESSMENT — ANXIETY QUESTIONNAIRES
IF YOU CHECKED OFF ANY PROBLEMS ON THIS QUESTIONNAIRE, HOW DIFFICULT HAVE THESE PROBLEMS MADE IT FOR YOU TO DO YOUR WORK, TAKE CARE OF THINGS AT HOME, OR GET ALONG WITH OTHER PEOPLE: EXTREMELY DIFFICULT
1. FEELING NERVOUS, ANXIOUS, OR ON EDGE: MORE THAN HALF THE DAYS
6. BECOMING EASILY ANNOYED OR IRRITABLE: NEARLY EVERY DAY
3. WORRYING TOO MUCH ABOUT DIFFERENT THINGS: MORE THAN HALF THE DAYS
7. FEELING AFRAID AS IF SOMETHING AWFUL MIGHT HAPPEN: MORE THAN HALF THE DAYS
GAD7 TOTAL SCORE: 14
2. NOT BEING ABLE TO STOP OR CONTROL WORRYING: SEVERAL DAYS
5. BEING SO RESTLESS THAT IT IS HARD TO SIT STILL: SEVERAL DAYS

## 2017-12-06 ASSESSMENT — PATIENT HEALTH QUESTIONNAIRE - PHQ9
SUM OF ALL RESPONSES TO PHQ QUESTIONS 1-9: 8
5. POOR APPETITE OR OVEREATING: NEARLY EVERY DAY

## 2017-12-06 NOTE — PROGRESS NOTES
SUBJECTIVE:   Bj Benitez is a 37 year old male who presents to clinic today for the following health issues:      ED/UC Followup:    Facility:  [facility]  Date of visit: 12/05/17  Reason for visit: panic attack  Current Status: stable.  Ongoing stresses at home, work, FH mental illnesses.  Open to meds and counseling          Abnormal Mood Symptoms      Duration: likely life long, worse past few months    Description:  Depression: YES  Anxiety: YES  Panic attacks: YES     Accompanying signs and symptoms: see PHQ-9 and GENIA scores    History (similar episodes/previous evaluation): None    Precipitating or alleviating factors: increased stress.  Breathing technics, distraction    Therapies tried and outcome: Ativan (Lorezepam) from ER helpful      Patient Active Problem List   Diagnosis     Scoliosis, unspecified scoliosis type, unspecified spinal region     CARDIOVASCULAR SCREENING; LDL GOAL LESS THAN 130     GENIA (generalized anxiety disorder)     Adjustment disorder with depressed mood     Past Surgical History:   Procedure Laterality Date     ENT SURGERY       NO HISTORY OF SURGERY         Social History   Substance Use Topics     Smoking status: Never Smoker     Smokeless tobacco: Never Used     Alcohol use No     Family History   Problem Relation Age of Onset     MENTAL ILLNESS Mother      Myocardial Infarction Father          Current Outpatient Prescriptions   Medication Sig Dispense Refill     sertraline (ZOLOFT) 50 MG tablet Take 1 tablet (50 mg) by mouth daily 30 tablet 1     LORazepam (ATIVAN) 1 MG tablet Take 1 tablet (1 mg) by mouth every 8 hours as needed for anxiety 20 tablet 0     BP Readings from Last 3 Encounters:   12/06/17 (!) 120/96   12/05/17 115/73   05/04/17 126/84    Wt Readings from Last 3 Encounters:   12/06/17 227 lb 1.6 oz (103 kg)   12/05/17 220 lb (99.8 kg)   05/04/17 225 lb (102.1 kg)                        Reviewed and updated as needed this visit by clinical staffTobacco  Allergies   "Meds  Med Hx  Surg Hx  Fam Hx  Soc Hx      Reviewed and updated as needed this visit by Provider         ROS:  Constitutional, HEENT, cardiovascular, pulmonary, gi and gu systems are negative, except as otherwise noted.      OBJECTIVE:   BP (!) 120/96 (BP Location: Right arm, Patient Position: Sitting, Cuff Size: Adult Large)  Pulse 91  Temp 97.5  F (36.4  C) (Oral)  Resp 16  Ht 5' 11.5\" (1.816 m)  Wt 227 lb 1.6 oz (103 kg)  SpO2 98%  BMI 31.23 kg/m2  Body mass index is 31.23 kg/(m^2).  GENERAL: healthy, alert and no distress  PSYCH: mentation appears normal, affect normal/bright and anxious        ASSESSMENT/PLAN:             (F41.1) GENIA (generalized anxiety disorder)  (primary encounter diagnosis)  Comment: multiple stressers  Plan: sertraline (ZOLOFT) 50 MG tablet        F/u 1 month.  [facility], warm handoff for short term counseling    (F43.21) Adjustment disorder with depressed mood  Comment:   Plan: sertraline (ZOLOFT) 50 MG tablet        See above        A total of 20 minutes was spent with the patient today, with greater than 50% of the visit involving counseling and coordination of care.      Doris Rodriguez NP  [facility]    "

## 2017-12-06 NOTE — NURSING NOTE
"Chief Complaint   Patient presents with     Follow Up For     [facility] ER on 12/05/17 anxiety       Initial BP (!) 120/96 (BP Location: Right arm, Patient Position: Sitting, Cuff Size: Adult Large)  Pulse 91  Temp 97.5  F (36.4  C) (Oral)  Resp 16  Ht 5' 11.5\" (1.816 m)  Wt 227 lb 1.6 oz (103 kg)  SpO2 98%  BMI 31.23 kg/m2 Estimated body mass index is 31.23 kg/(m^2) as calculated from the following:    Height as of this encounter: 5' 11.5\" (1.816 m).    Weight as of this encounter: 227 lb 1.6 oz (103 kg).  Medication Reconciliation: complete    "

## 2017-12-06 NOTE — MR AVS SNAPSHOT
After Visit Summary   12/6/2017    Bj Benitez    MRN: [id number]           Patient Information     Date Of Birth          1980        Visit Information        Provider Department      12/6/2017 10:40 AM Doris Rodriguez NP [facility]        Today's Diagnoses     GENIA (generalized anxiety disorder)    -  1    Adjustment disorder with depressed mood           Follow-ups after your visit        Your next 10 appointments already scheduled     Dec 11, 2017  9:00 AM CST   [facility] with [facility]   [facility]    [address] [facility] [address]  [facility] [address] [phone]              Who to contact     If you have questions or need follow up information about today's clinic visit or your schedule please contact [facility] directly at [phone].  Normal or non-critical lab and imaging results will be communicated to you by MyChart, letter or phone within 4 business days after the clinic has received the results. If you do not hear from us within 7 days, please contact the clinic through MyChart or phone. If you have a critical or abnormal lab result, we will notify you by phone as soon as possible.  Submit refill requests through [employer] or call your pharmacy and they will forward the refill request to us. Please allow 3 business days for your refill to be completed.          Additional Information About Your Visit        MyChart Information     [facility] gives you secure access to your electronic health record. If you see a primary care provider, you can also send messages to your care team and make appointments. If you have questions, please call your primary care clinic.  If you do not have a primary care provider, please call [phone] and they will assist you.        Care EveryWhere ID     This is your Care EveryWhere ID. This could be used by other organizations to access your [facility]  "medical records  [id number]        Your Vitals Were     Pulse Temperature Respirations Height Pulse Oximetry BMI (Body Mass Index)    91 97.5  F (36.4  C) (Oral) 16 5' 11.5\" (1.816 m) 98% 31.23 kg/m2       Blood Pressure from Last 3 Encounters:   12/06/17 (!) 120/96   12/05/17 115/73   05/04/17 126/84    Weight from Last 3 Encounters:   12/06/17 227 lb 1.6 oz (103 kg)   12/05/17 220 lb (99.8 kg)   05/04/17 225 lb (102.1 kg)              Today, you had the following     No orders found for display         Today's Medication Changes          These changes are accurate as of: 12/6/17 12:43 PM.  If you have any questions, ask your nurse or doctor.               Start taking these medicines.        Dose/Directions    sertraline 50 MG tablet   Commonly known as:  ZOLOFT   Used for:  GENIA (generalized anxiety disorder), Adjustment disorder with depressed mood   Started by:  Doris Rodriguez, MARI        Dose:  50 mg   Take 1 tablet (50 mg) by mouth daily   Quantity:  30 tablet   Refills:  1            Where to get your medicines      These medications were sent to [place] [address] [place] [address] [address]     Phone:  [phone]     sertraline 50 MG tablet                Primary Care Provider Fax #    Physician No Ref-Primary [phone]       No address on file        Equal Access to Services     STEPHANI ARRINGTON AH: Hadii matt multani Somadiha, waaxda luqadaha, qaybta kaalmada adeegyada, waxjefferson enedina jackson adealyx espinal. So [facility] [phone].    ATENCIÓN: Si habla español, tiene a arizmendi disposición servicios gratuitos de asistencia lingüística. Clarita al [phone].    We comply with applicable federal civil rights laws and [place] laws. We do not discriminate on the basis of race, color, national origin, age, disability, sex, sexual orientation, or gender identity.            Thank you!     Thank you for choosing [facility]  for " your care. Our goal is always to provide you with excellent care. Hearing back from our patients is one way we can continue to improve our services. Please take a few minutes to complete the written survey that you may receive in the mail after your visit with us. Thank you!             Your Updated Medication List - Protect others around you: Learn how to safely use, store and throw away your medicines at www.disposemymeds.org.          This list is accurate as of: 12/6/17 12:43 PM.  Always use your most recent med list.                   Brand Name Dispense Instructions for use Diagnosis    LORazepam 1 MG tablet    ATIVAN    20 tablet    Take 1 tablet (1 mg) by mouth every 8 hours as needed for anxiety        sertraline 50 MG tablet    ZOLOFT    30 tablet    Take 1 tablet (50 mg) by mouth daily    GENIA (generalized anxiety disorder), Adjustment disorder with depressed mood

## 2017-12-07 ASSESSMENT — ANXIETY QUESTIONNAIRES: GAD7 TOTAL SCORE: 14

## 2017-12-08 ENCOUNTER — HOSPITAL ENCOUNTER (EMERGENCY)
Facility: CLINIC | Age: 37
Discharge: HOME | End: 2017-12-08
Attending: EMERGENCY MEDICINE | Admitting: EMERGENCY MEDICINE
Payer: COMMERCIAL

## 2017-12-08 ENCOUNTER — TELEPHONE (OUTPATIENT)
Dept: BEHAVIORAL HEALTH | Facility: CLINIC | Age: 37
End: 2017-12-08

## 2017-12-08 ENCOUNTER — TELEPHONE (OUTPATIENT)
Dept: INTERNAL MEDICINE | Facility: CLINIC | Age: 37
End: 2017-12-08

## 2017-12-08 VITALS
SYSTOLIC BLOOD PRESSURE: 135 MMHG | OXYGEN SATURATION: 99 % | DIASTOLIC BLOOD PRESSURE: 82 MMHG | HEART RATE: 91 BPM | TEMPERATURE: 97.8 F | RESPIRATION RATE: 18 BRPM

## 2017-12-08 DIAGNOSIS — F41.9 ANXIETY: ICD-10-CM

## 2017-12-08 DIAGNOSIS — F41.1 GAD (GENERALIZED ANXIETY DISORDER): Primary | ICD-10-CM

## 2017-12-08 PROCEDURE — 99285 EMERGENCY DEPT VISIT HI MDM: CPT | Mod: 25

## 2017-12-08 PROCEDURE — 90791 PSYCH DIAGNOSTIC EVALUATION: CPT

## 2017-12-08 RX ORDER — QUETIAPINE FUMARATE 25 MG/1
25-50 TABLET, FILM COATED ORAL
Qty: 15 TABLET | Refills: 1 | Status: SHIPPED | OUTPATIENT
Start: 2017-12-08

## 2017-12-08 RX ORDER — PROPRANOLOL HYDROCHLORIDE 10 MG/1
10 TABLET ORAL 3 TIMES DAILY PRN
Qty: 30 TABLET | Refills: 1 | Status: SHIPPED | OUTPATIENT
Start: 2017-12-08

## 2017-12-08 ASSESSMENT — ENCOUNTER SYMPTOMS
CONFUSION: 0
AGITATION: 0
HALLUCINATIONS: 0
NERVOUS/ANXIOUS: 1

## 2017-12-08 NOTE — TELEPHONE ENCOUNTER
Reason for Call:  Other prescription    Detailed comments: Pt is having trouble with a med.    Phone Number Patient can be reached at: Home number on file [phone]    Best Time: any    Can we leave a detailed message on this number? YES    Call taken on 12/8/2017 at 8:44 AM by Jamila Steel

## 2017-12-08 NOTE — TELEPHONE ENCOUNTER
This RN spoke to pt's wife at about 9:30am today and routed message to Dr. Carl as Doris is out of the office. See additional telephone encounter for Doris Rodriguez for further details.

## 2017-12-08 NOTE — TELEPHONE ENCOUNTER
His dose is high on ativan   I am not comfortable with increasing it     He could try propranolol if needed   It helps to control the symptoms of anxiety   He can take prior to a meeting or event if needed or as needed 2-3 times per day   Let Doris know how he is doing early next week

## 2017-12-08 NOTE — ED AVS SNAPSHOT
[facility] Emergency Department    [address] [place] [address]    Phone:  [phone]    Fax:  [phone]                                       Bj Benitez   MRN: [id number]    Department:  [facility] Emergency Department   Date of Visit:  12/8/2017           Patient Information     Date Of Birth          1980        Your diagnoses for this visit were:     Anxiety        You were seen by Jackelyn Tineo MD.      Follow-up Information     Follow up with No Ref-Primary, Physician. Schedule an appointment as soon as possible for a visit in 3 days.        Discharge Instructions       Please return to the ED if you notice increasing suicidal ideation or thoughts, thoughts of hurting yourself or others, hallucinations, difficulty taking your medications or other acute concerns.  Please follow up with your PCP/psych in the next 2-3 days.      May try seroquel for sleep.  Start with 1 tablet if within 1 hour you havent fallen asleep you can try 1 more tablet.  Do not take ativan with this medication.      Discharge Instructions  Mental Health Concerns    You were seen today for mental health concerns, such as depression, anxiety, or suicidal thinking. Your provider feels that you do not require hospitalization at this time. However, your symptoms may become worse, and you may need to return to the Emergency Department. Most treatments of depression and suicidal thoughts are a process rather than a single intervention.  Medications and counseling can take several weeks or more to help.    Generally, every Emergency Department visit should have a follow-up clinic visit with either a primary or a specialty clinic/provider. Please follow-up as instructed by your emergency provider today.    By accepting these discharge instructions:    You promise to not harm yourself or others.    You agree that if you feel you are becoming unable to keep that promise, you will do  something to help yourself before you do anything to harm yourself or others.     You agree to keep any safety plan arranged on your visit here today.    You agree to take any medication prescribed or recommended by your provider.    If you are getting worse, you can contact a friend or a family member, contact your counselor or family provider, contact a crisis line, or other options discussed with the provider or therapist today.    At any time, you can call 911 and return to the Emergency Department for more help.    You understand that follow-up is essential to your treatment, and you will make and keep appointments recommended on your visit today.    How to improve your mental health and prevent suicide:    Involve others by letting family, friends, counselors know.  Do not isolate yourself.    Avoid alcohol or drugs. Remove weapons, poisons from your home.    Try to stick to routines for eating, sleeping and getting regular exercise.      Try to get into sunlight. Bright natural light not only treats seasonal affective disorder but also depression.    Increase safe activities that you enjoy.    If you feel worse, contact [phone] ([phone]), or call 911, or your primary provider/counselor for additional assistance.    If you were given a prescription for medicine here today, be sure to read all of the information (including the package insert) that comes with your prescription.  This will include important information about the medicine, its side effects, and any warnings that you need to know about.  The pharmacist who fills the prescription can provide more information and answer questions you may have about the medicine.  If you have questions or concerns that the pharmacist cannot address, please call or return to the Emergency Department.   Remember that you can always come back to the Emergency Department if you are not able to see your regular provider in the amount of time listed above, if  you get any new symptoms, or if there is anything that worries you.      Future Appointments        Provider Department Dept Phone Center    12/11/2017 9:00 AM ANA Barrera [facility] [phone] [place]      24 Hour Appointment Hotline       To make an appointment at any [facility], call [phone] ([phone]). If you don't have a family doctor or clinic, we will help you find one. [facility] are conveniently located to serve the needs of you and your family.             Review of your medicines      START taking        Dose / Directions Last dose taken    QUEtiapine 25 MG tablet   Commonly known as:  SEROQUEL   Dose:  25-50 mg   Quantity:  15 tablet        Take 1-2 tablets (25-50 mg) by mouth nightly as needed   Refills:  1          Our records show that you are taking the medicines listed below. If these are incorrect, please call your family doctor or clinic.        Dose / Directions Last dose taken    LORazepam 1 MG tablet   Commonly known as:  ATIVAN   Dose:  1 mg   Quantity:  20 tablet        Take 1 tablet (1 mg) by mouth every 8 hours as needed for anxiety   Refills:  0        propranolol 10 MG tablet   Commonly known as:  INDERAL   Dose:  10 mg   Quantity:  30 tablet        Take 1 tablet (10 mg) by mouth 3 times daily as needed Anxiety   Refills:  1        sertraline 50 MG tablet   Commonly known as:  ZOLOFT   Dose:  50 mg   Quantity:  30 tablet        Take 1 tablet (50 mg) by mouth daily   Refills:  1                Prescriptions were sent or printed at these locations (1 Prescription)                   Other Prescriptions                Printed at Department/Unit printer (1 of 1)         QUEtiapine (SEROQUEL) 25 MG tablet                Orders Needing Specimen Collection     None      Pending Results     No orders found from 12/6/2017 to 12/9/2017.            Pending Culture Results     No orders found from 12/6/2017 to 12/9/2017.            Pending Results  Instructions     If you had any lab results that were not finalized at the time of your Discharge, you can call the ED Lab Result RN at [phone]. You will be contacted by this team for any positive Lab results or changes in treatment. The nurses are available 7 days a week from 10A to 6:30P.  You can leave a message 24 hours per day and they will return your call.        Test Results From Your Hospital Stay               Clinical Quality Measure: Blood Pressure Screening     Your blood pressure was checked while you were in the emergency department today. The last reading we obtained was  BP: 135/82 . Please read the guidelines below about what these numbers mean and what you should do about them.  If your systolic blood pressure (the top number) is less than 120 and your diastolic blood pressure (the bottom number) is less than 80, then your blood pressure is normal. There is nothing more that you need to do about it.  If your systolic blood pressure (the top number) is 120-139 or your diastolic blood pressure (the bottom number) is 80-89, your blood pressure may be higher than it should be. You should have your blood pressure rechecked within a year by a primary care provider.  If your systolic blood pressure (the top number) is 140 or greater or your diastolic blood pressure (the bottom number) is 90 or greater, you may have high blood pressure. High blood pressure is treatable, but if left untreated over time it can put you at risk for heart attack, stroke, or kidney failure. You should have your blood pressure rechecked by a primary care provider within the next 4 weeks.  If your provider in the emergency department today gave you specific instructions to follow-up with your doctor or provider even sooner than that, you should follow that instruction and not wait for up to 4 weeks for your follow-up visit.        Thank you for choosing [facility]       Thank you for choosing [facility] for your care. Our goal  is always to provide you with excellent care. Hearing back from our patients is one way we can continue to improve our services. Please take a few minutes to complete the written survey that you may receive in the mail after you visit with us. Thank you!        [employer] Information     [employer] gives you secure access to your electronic health record. If you see a primary care provider, you can also send messages to your care team and make appointments. If you have questions, please call your primary care clinic.  If you do not have a primary care provider, please call [phone] and they will assist you.        Care EveryWhere ID     This is your Care EveryWhere ID. This could be used by other organizations to access your [facility] medical records  [id number]        Equal Access to Services     STEPHANI ARRINGTON : Antonietta Mendoza, gregg jovel, sarah agudelo, diamond espinal. So [facility] [phone].    ATENCIÓN: Si habla español, tiene a arizmendi disposición servicios gratuitos de asistencia lingüística. Llame al [phone].    We comply with applicable federal civil rights laws and [place] laws. We do not discriminate on the basis of race, color, national origin, age, disability, sex, sexual orientation, or gender identity.            After Visit Summary       This is your record. Keep this with you and show to your community pharmacist(s) and doctor(s) at your next visit.

## 2017-12-08 NOTE — TELEPHONE ENCOUNTER
Pt's wife calls - consent to communicate on file.   States pt has prescription for Ativan from ER, first few times he took this it worked okay, but he has noticed it is working less and less each dose. He took a dose last night and did not notice any change. Doris started him on Sertraline 12/6/17, but has only taken a few doses so this is not helping yet. She asks if there is a different or stronger medication he can take for anxiety until Sertraline starts to work.     Sent to covering provider to review.

## 2017-12-08 NOTE — TELEPHONE ENCOUNTER
Called Wife, informed of message below from provider. Wife asks if patient can take the medication tonight as pt is already having panic attacks in anticipation of tomorrows event. Advised can take medication 3 times daily as needed for Anxiety. Wife states pt has appointment with Suzanne Heard Monday and will provide update for provider then.

## 2017-12-08 NOTE — ED AVS SNAPSHOT
[facility] Emergency Department    [address]    [facility] [address]    Phone:  [phone]    Fax:  [phone]                                       Bj Benitez   MRN: [id number]    Department:  [facility] Emergency Department   Date of Visit:  12/8/2017           After Visit Summary Signature Page     I have received my discharge instructions, and my questions have been answered. I have discussed any challenges I see with this plan with the nurse or doctor.    ..........................................................................................................................................  Patient/Patient Representative Signature      ..........................................................................................................................................  Patient Representative Print Name and Relationship to Patient    ..................................................               ................................................  Date                                            Time    ..........................................................................................................................................  Reviewed by Signature/Title    ...................................................              ..............................................  Date                                                            Time

## 2017-12-09 NOTE — ED NOTES
Pt complains of panic attack, has been seen twice this week and put on ativan and propranolol. Now racing thoughts and inability to sleep

## 2017-12-09 NOTE — ED PROVIDER NOTES
"  History     Chief Complaint:  Anxiety    HPI   Bj Benitez is a 37 year old male who presents to the emergency department today for evaluation of anxiety. Per chart review, the patient was seen and evaluation in the ED two days ago for similar presentation of anxiety secondary to home stressors and a \"major panic attack.\" At this time, he had an unremarkable work up and treated symptomatically with ativan and felt better. Patient ultimately discharged to home with a prescription for 1mg Ativan which has been helping at night. He followed up with his PCP clinic the next day, Doris Rodriguez NP, and plan is to start Zoloft and follow up in one month. However, the patient reports his anxiety has been worsening and the medications are not helping. The patient's wife called the clinic and they recommended the patient can take propranolol if needed as they were not comfortable to increase his ativan dose. After taking the propranolol and taking a nap, he woke up in a panic state, therefore, prompting his visit to the ED for further evaluation. Upon presentation, he notes there has been \"ups and downs\" in his panic states and started feeling anxious around 3192-1421 today while at the mall. He has been \"fighting off\" his anxious feeling throughout the day and \"it's really starting to bear on me.\" He is scared about going home and \"having it happen again\" and \"feels out of control Additionally, he has an appointment scheduled with a therapist in a few days. The patient denies suicidal ideation has no other concerns at this time.     Allergies:  No Known Drug Allergies     Medications:    LORazepam (ATIVAN) 1 MG tablet  sertraline (ZOLOFT) 50 MG tablet  propranolol (INDERAL) 10 MG tablet    Past Medical History:    Generalized anxiety disorder  Scoliosis    Past Surgical History:    ENT surgery    Family History:    Mental illness (Bipolar)  MI    Social History:  The patient was accompanied to the ED by his " wife.  Smoking Status: Never  Smokeless Tobacco: Never  Alcohol Use: No  Marital Status:        Review of Systems   Psychiatric/Behavioral: Negative for agitation, confusion, hallucinations, self-injury and suicidal ideas. The patient is nervous/anxious.    All other systems reviewed and are negative.    Physical Exam   First Vitals:  BP: (!) 137/108  Pulse: 91  Heart Rate: 91  Temp: 97.8  F (36.6  C)  Resp: 18  SpO2: 99 %    Physical Exam  Physical Exam   General:  Sitting on bed, comfortable appearing.   Head:  No obvious trauma to head  Ears, Nose:  External ears normal.  Nose normal.   Eyes:  Conjunctivae and EOM are normal.  Neck:  Normal range of motion. Neck supple and symmetric.    Pulm/Chest:  No respiratory distress.  Breathing comfortably.    MSK:  Normal range of motion.   Neuro:  Alert. Moving all extremities.    Skin:  Skin is warm and dry.    Psych:  anxious mood and normal affect. Behavior is normal. organized.  No AH VH SI or HI.      Emergency Department Course     Emergency Department Course:  Nursing notes and vitals reviewed.  1813: I performed an exam of the patient as documented above.   2136: I spoke with DEC regarding their assessment and plan of care.   2200: Patient rechecked and updated.   Findings and plan explained to the Patient and spouse. Patient discharged home with instructions regarding supportive care, medications, and reasons to return. The importance of close follow-up was reviewed. The patient was prescribed Seroquel.     Impression & Plan      Medical Decision Making:  Bj Benitez is a 37 year old male presenting with anxiety attacks. Patient has recently been diagnosed with anxiety. He is having ongoing anxiety attacks. He is just started on Zoloft and propranolol and ativan three days ago. These are helping his symptoms somewhat, but he is still having anxiety issues. Social work spent a prolonged period of time with the patient and helped to make sure the patient  was doing well. Patient agrees with the plan. Do not feel patient requires inpatient admission.  Will set up outpatient psychiatrist. Will go to therapy as scheduled. Discussed behavioral technique to help him calm himself down. He voiced understanding of the plan. He his given Seroquel to help him sleep and anxiety at night. He does better when he sleeps well.  Discussed at night to not combine ativan with Seroquel. He was discharged in stable condition.     Diagnosis:    ICD-10-CM    1. Anxiety F41.9      Disposition:  Discharged to home    Discharge Medications:  New Prescriptions    QUETIAPINE (SEROQUEL) 25 MG TABLET    Take 1-2 tablets (25-50 mg) by mouth nightly as needed       Scribe Disclosure:  I, Netta Morgan, am serving as a scribe at 7:40 PM on 12/8/2017 to document services personally performed by Jackelyn Tineo MD based on my observations and the provider's statements to me.     12/8/2017   [facility] EMERGENCY DEPARTMENT       Jackelyn Tineo MD  12/09/17 0158

## 2017-12-09 NOTE — DISCHARGE INSTRUCTIONS
Please return to the ED if you notice increasing suicidal ideation or thoughts, thoughts of hurting yourself or others, hallucinations, difficulty taking your medications or other acute concerns.  Please follow up with your PCP/psych in the next 2-3 days.      May try seroquel for sleep.  Start with 1 tablet if within 1 hour you havent fallen asleep you can try 1 more tablet.  Do not take ativan with this medication.      Discharge Instructions  Mental Health Concerns    You were seen today for mental health concerns, such as depression, anxiety, or suicidal thinking. Your provider feels that you do not require hospitalization at this time. However, your symptoms may become worse, and you may need to return to the Emergency Department. Most treatments of depression and suicidal thoughts are a process rather than a single intervention.  Medications and counseling can take several weeks or more to help.    Generally, every Emergency Department visit should have a follow-up clinic visit with either a primary or a specialty clinic/provider. Please follow-up as instructed by your emergency provider today.    By accepting these discharge instructions:    You promise to not harm yourself or others.    You agree that if you feel you are becoming unable to keep that promise, you will do something to help yourself before you do anything to harm yourself or others.     You agree to keep any safety plan arranged on your visit here today.    You agree to take any medication prescribed or recommended by your provider.    If you are getting worse, you can contact a friend or a family member, contact your counselor or family provider, contact a crisis line, or other options discussed with the provider or therapist today.    At any time, you can call 911 and return to the Emergency Department for more help.    You understand that follow-up is essential to your treatment, and you will make and keep appointments recommended on your  visit today.    How to improve your mental health and prevent suicide:    Involve others by letting family, friends, counselors know.  Do not isolate yourself.    Avoid alcohol or drugs. Remove weapons, poisons from your home.    Try to stick to routines for eating, sleeping and getting regular exercise.      Try to get into sunlight. Bright natural light not only treats seasonal affective disorder but also depression.    Increase safe activities that you enjoy.    If you feel worse, contact [phone] ([phone]), or call 911, or your primary provider/counselor for additional assistance.    If you were given a prescription for medicine here today, be sure to read all of the information (including the package insert) that comes with your prescription.  This will include important information about the medicine, its side effects, and any warnings that you need to know about.  The pharmacist who fills the prescription can provide more information and answer questions you may have about the medicine.  If you have questions or concerns that the pharmacist cannot address, please call or return to the Emergency Department.   Remember that you can always come back to the Emergency Department if you are not able to see your regular provider in the amount of time listed above, if you get any new symptoms, or if there is anything that worries you.

## 2017-12-11 ENCOUNTER — OFFICE VISIT (OUTPATIENT)
Dept: INTERNAL MEDICINE | Facility: CLINIC | Age: 37
End: 2017-12-11
Payer: COMMERCIAL

## 2017-12-11 ENCOUNTER — OFFICE VISIT (OUTPATIENT)
Dept: BEHAVIORAL HEALTH | Facility: CLINIC | Age: 37
End: 2017-12-11
Payer: COMMERCIAL

## 2017-12-11 VITALS
TEMPERATURE: 97.6 F | HEART RATE: 88 BPM | DIASTOLIC BLOOD PRESSURE: 82 MMHG | SYSTOLIC BLOOD PRESSURE: 124 MMHG | BODY MASS INDEX: 30.1 KG/M2 | OXYGEN SATURATION: 96 % | RESPIRATION RATE: 16 BRPM | HEIGHT: 72 IN | WEIGHT: 222.2 LBS

## 2017-12-11 DIAGNOSIS — F32.1 MODERATE SINGLE CURRENT EPISODE OF MAJOR DEPRESSIVE DISORDER (H): ICD-10-CM

## 2017-12-11 DIAGNOSIS — F41.1 GAD (GENERALIZED ANXIETY DISORDER): Primary | ICD-10-CM

## 2017-12-11 PROBLEM — F43.21 ADJUSTMENT DISORDER WITH DEPRESSED MOOD: Status: RESOLVED | Noted: 2017-12-06 | Resolved: 2017-12-11

## 2017-12-11 PROCEDURE — 90785 PSYTX COMPLEX INTERACTIVE: CPT | Performed by: MARRIAGE & FAMILY THERAPIST

## 2017-12-11 PROCEDURE — 99213 OFFICE O/P EST LOW 20 MIN: CPT | Performed by: NURSE PRACTITIONER

## 2017-12-11 PROCEDURE — 90837 PSYTX W PT 60 MINUTES: CPT | Performed by: MARRIAGE & FAMILY THERAPIST

## 2017-12-11 ASSESSMENT — ANXIETY QUESTIONNAIRES
IF YOU CHECKED OFF ANY PROBLEMS ON THIS QUESTIONNAIRE, HOW DIFFICULT HAVE THESE PROBLEMS MADE IT FOR YOU TO DO YOUR WORK, TAKE CARE OF THINGS AT HOME, OR GET ALONG WITH OTHER PEOPLE: EXTREMELY DIFFICULT
3. WORRYING TOO MUCH ABOUT DIFFERENT THINGS: NEARLY EVERY DAY
1. FEELING NERVOUS, ANXIOUS, OR ON EDGE: NEARLY EVERY DAY
5. BEING SO RESTLESS THAT IT IS HARD TO SIT STILL: NEARLY EVERY DAY
GAD7 TOTAL SCORE: 20
6. BECOMING EASILY ANNOYED OR IRRITABLE: MORE THAN HALF THE DAYS
7. FEELING AFRAID AS IF SOMETHING AWFUL MIGHT HAPPEN: NEARLY EVERY DAY
2. NOT BEING ABLE TO STOP OR CONTROL WORRYING: NEARLY EVERY DAY

## 2017-12-11 ASSESSMENT — PATIENT HEALTH QUESTIONNAIRE - PHQ9
SUM OF ALL RESPONSES TO PHQ QUESTIONS 1-9: 11
5. POOR APPETITE OR OVEREATING: NEARLY EVERY DAY

## 2017-12-11 NOTE — PROGRESS NOTES
SUBJECTIVE:   Bj Benitez is a 37 year old male who presents to clinic today for the following health issues:      ED/UC Followup:    Facility:  [facility]  Date of visit: 12/08/17  Reason for visit: anxiety  Current Status: feels better, slept better last night with ativan, has psychiatrist  appt this week.  Seroquil caused vivid night terrors.  Was unclear how he should take the propanolol.         Patient Active Problem List   Diagnosis     Scoliosis, unspecified scoliosis type, unspecified spinal region     CARDIOVASCULAR SCREENING; LDL GOAL LESS THAN 130     GENIA (generalized anxiety disorder)     Adjustment disorder with depressed mood     Past Surgical History:   Procedure Laterality Date     ENT SURGERY       NO HISTORY OF SURGERY         Social History   Substance Use Topics     Smoking status: Never Smoker     Smokeless tobacco: Never Used     Alcohol use No     Family History   Problem Relation Age of Onset     MENTAL ILLNESS Mother      Myocardial Infarction Father          Current Outpatient Prescriptions   Medication Sig Dispense Refill     propranolol (INDERAL) 10 MG tablet Take 1 tablet (10 mg) by mouth 3 times daily as needed Anxiety 30 tablet 1     sertraline (ZOLOFT) 50 MG tablet Take 1 tablet (50 mg) by mouth daily 30 tablet 1     LORazepam (ATIVAN) 1 MG tablet Take 1 tablet (1 mg) by mouth every 8 hours as needed for anxiety 20 tablet 0     QUEtiapine (SEROQUEL) 25 MG tablet Take 1-2 tablets (25-50 mg) by mouth nightly as needed (Patient not taking: Reported on 12/11/2017) 15 tablet 1     BP Readings from Last 3 Encounters:   12/11/17 124/82   12/08/17 135/82   12/06/17 (!) 120/96    Wt Readings from Last 3 Encounters:   12/11/17 222 lb 3.2 oz (100.8 kg)   12/06/17 227 lb 1.6 oz (103 kg)   12/05/17 220 lb (99.8 kg)                        Reviewed and updated as needed this visit by clinical staffTobacco  Allergies  Meds  Med Hx  Surg Hx  Fam Hx  Soc Hx      Reviewed and updated as needed this  "visit by Provider         ROS:  Constitutional, HEENT, cardiovascular, pulmonary, gi and gu systems are negative, except as otherwise noted.      OBJECTIVE:   /82 (BP Location: Right arm, Patient Position: Sitting, Cuff Size: Adult Large)  Pulse 88  Temp 97.6  F (36.4  C) (Oral)  Resp 16  Ht 5' 11.5\" (1.816 m)  Wt 222 lb 3.2 oz (100.8 kg)  SpO2 96%  BMI 30.56 kg/m2  Body mass index is 30.56 kg/(m^2).  GENERAL: healthy, alert and no distress  PSYCH: mentation appears normal, affect normal/bright        ASSESSMENT/PLAN:               ICD-10-CM    1. GENIA (generalized anxiety disorder) F41.1        Patient Instructions   Continue zoloft  Ativan every 6-8 hours as needed, at bedtime  Psychiatrist this week for recommendation    Doris Rodriguez CNP      .    A total of 15 minutes was spent with the patient today, with greater than 50% of the visit involving counseling and coordination of care.    Doris Rodriguez NP  [facility]    "

## 2017-12-11 NOTE — MR AVS SNAPSHOT
After Visit Summary   12/11/2017    Bj Benitez    MRN: [id number]           Patient Information     Date Of Birth          1980        Visit Information        Provider Department      12/11/2017 9:00 AM Suzanne Lazcano LMFT [facility]        Today's Diagnoses     GENIA (generalized anxiety disorder)    -  1    Moderate single current episode of major depressive disorder (H)           Follow-ups after your visit        Your next 10 appointments already scheduled     Dec 18, 2017  9:30 AM CST   New Visit with ANA Barrera   [facility]    [address]  [facility] [address] [phone]              Who to contact     If you have questions or need follow up information about today's clinic visit or your schedule please contact [facility] directly at [phone].  Normal or non-critical lab and imaging results will be communicated to you by MyChart, letter or phone within 4 business days after the clinic has received the results. If you do not hear from us within 7 days, please contact the clinic through [employer] red truck driving schoolhart or phone. If you have a critical or abnormal lab result, we will notify you by phone as soon as possible.  Submit refill requests through Quickshift or call your pharmacy and they will forward the refill request to us. Please allow 3 business days for your refill to be completed.          Additional Information About Your Visit        MyChart Information     Quickshift gives you secure access to your electronic health record. If you see a primary care provider, you can also send messages to your care team and make appointments. If you have questions, please call your primary care clinic.  If you do not have a primary care provider, please call [phone] and they will assist you.        Care EveryWhere ID     This is your Care EveryWhere ID. This could be used by other  organizations to access your [place] medical records  [id number]         Blood Pressure from Last 3 Encounters:   12/11/17 124/82   12/08/17 135/82   12/06/17 (!) 120/96    Weight from Last 3 Encounters:   12/11/17 100.8 kg (222 lb 3.2 oz)   12/06/17 103 kg (227 lb 1.6 oz)   12/05/17 99.8 kg (220 lb)              We Performed the Following     C PSYCHOTHERAPY COMPLEX INTERACTIVE        Primary Care Provider Fax #    Physician No Ref-Primary [phone]       No address on file        Equal Access to Services     [facility] : Hadii matt Mendoza, wasandie jovel, qaybfrancia kaalmaradu agudelo, diamond rodriguez . So [facility] [phone].    ATENCIÓN: Si habla español, tiene a arizmendi disposición servicios gratuitos de asistencia lingüística. [facility] [phone].    We comply with applicable federal civil rights laws and [place] laws. We do not discriminate on the basis of race, color, national origin, age, disability, sex, sexual orientation, or gender identity.            Thank you!     Thank you for choosing [facility]  for your care. Our goal is always to provide you with excellent care. Hearing back from our patients is one way we can continue to improve our services. Please take a few minutes to complete the written survey that you may receive in the mail after your visit with us. Thank you!             Your Updated Medication List - Protect others around you: Learn how to safely use, store and throw away your medicines at www.disposemymeds.org.          This list is accurate as of: 12/11/17  4:05 PM.  Always use your most recent med list.                   Brand Name Dispense Instructions for use Diagnosis    LORazepam 1 MG tablet    ATIVAN    20 tablet    Take 1 tablet (1 mg) by mouth every 8 hours as needed for anxiety        propranolol 10 MG tablet    INDERAL    30 tablet    Take 1 tablet (10 mg) by mouth 3 times daily as needed Anxiety    GENIA (generalized  anxiety disorder)       QUEtiapine 25 MG tablet    SEROQUEL    15 tablet    Take 1-2 tablets (25-50 mg) by mouth nightly as needed        sertraline 50 MG tablet    ZOLOFT    30 tablet    Take 1 tablet (50 mg) by mouth daily    GENIA (generalized anxiety disorder), Adjustment disorder with depressed mood

## 2017-12-11 NOTE — MR AVS SNAPSHOT
After Visit Summary   12/11/2017    Bj Benitez    MRN: [id number]           Patient Information     Date Of Birth          1980        Visit Information        Provider Department      12/11/2017 10:40 AM Doris Rodriguez NP [facility]        Today's Diagnoses     GENIA (generalized anxiety disorder)    -  1      Care Instructions    Continue zoloft  Ativan every 6-8 hours as needed, at bedtime  Psychiatrist this week for recommendation    Doris Rodriguez CNP      .          Follow-ups after your visit        Your next 10 appointments already scheduled     Dec 11, 2017 10:40 AM CST   SHORT with Doris Rodriguez NP   [facility] ([facility])    [address]  [facility] [id number] [phone]            Dec 18, 2017  9:30 AM CST   New Visit with ANA Barrera   [facility] ([facility])    [address]  [facility] [id number] [phone]              Who to contact     If you have questions or need follow up information about today's clinic visit or your schedule please contact [facility] directly at [phone].  Normal or non-critical lab and imaging results will be communicated to you by MyChart, letter or phone within 4 business days after the clinic has received the results. If you do not hear from us within 7 days, please contact the clinic through DeckDAQhart or phone. If you have a critical or abnormal lab result, we will notify you by phone as soon as possible.  Submit refill requests through Ostara or call your pharmacy and they will forward the refill request to us. Please allow 3 business days for your refill to be completed.          Additional Information About Your Visit        MyChart Information     Ostara gives you secure access to your electronic health record. If you see a primary care provider, you can also send messages to your  "care team and make appointments. If you have questions, please call your primary care clinic.  If you do not have a primary care provider, please call [phone] and they will assist you.        Care EveryWhere ID     This is your Care EveryWhere ID. This could be used by other organizations to access your [place] medical records  [id number]        Your Vitals Were     Pulse Temperature Respirations Height Pulse Oximetry BMI (Body Mass Index)    88 97.6  F (36.4  C) (Oral) 16 5' 11.5\" (1.816 m) 96% 30.56 kg/m2       Blood Pressure from Last 3 Encounters:   12/11/17 124/82   12/08/17 135/82   12/06/17 (!) 120/96    Weight from Last 3 Encounters:   12/11/17 222 lb 3.2 oz (100.8 kg)   12/06/17 227 lb 1.6 oz (103 kg)   12/05/17 220 lb (99.8 kg)              Today, you had the following     No orders found for display       Primary Care Provider Fax #    Physician No Ref-Primary [phone]       No address on file        Equal Access to Services     [facility] : Hadii matt Mendoza, wajacquelineda med, qaybta kaalcalista agudelo, diamond rodriguez . So [facility] [phone].    ATENCIÓN: Si habla español, tiene a arizmendi disposición servicios gratuitos de asistencia lingüística. [facility] al [phone].    We comply with applicable federal civil rights laws and [place] laws. We do not discriminate on the basis of race, color, national origin, age, disability, sex, sexual orientation, or gender identity.            Thank you!     Thank you for choosing [facility]  for your care. Our goal is always to provide you with excellent care. Hearing back from our patients is one way we can continue to improve our services. Please take a few minutes to complete the written survey that you may receive in the mail after your visit with us. Thank you!             Your Updated Medication List - Protect others around you: Learn how to safely use, store and throw away your medicines at " www.disposemymeds.org.          This list is accurate as of: 12/11/17 10:33 AM.  Always use your most recent med list.                   Brand Name Dispense Instructions for use Diagnosis    LORazepam 1 MG tablet    ATIVAN    20 tablet    Take 1 tablet (1 mg) by mouth every 8 hours as needed for anxiety        propranolol 10 MG tablet    INDERAL    30 tablet    Take 1 tablet (10 mg) by mouth 3 times daily as needed Anxiety    GENIA (generalized anxiety disorder)       QUEtiapine 25 MG tablet    SEROQUEL    15 tablet    Take 1-2 tablets (25-50 mg) by mouth nightly as needed        sertraline 50 MG tablet    ZOLOFT    30 tablet    Take 1 tablet (50 mg) by mouth daily    GENIA (generalized anxiety disorder), Adjustment disorder with depressed mood

## 2017-12-11 NOTE — PROGRESS NOTES
[facility]  December 11, 2017      Behavioral Health Clinician Progress Note    Patient Name: Bj Benitez           Service Type:  Couple      Service Location:   Face to Face in Clinic     Session Start Time: 9:05  Session End Time: 9:50      Session Length: 38 - 52      Attendees: Client and Spouse / Significant Other    Visit Activities (Refresh list every visit): NEW and [facility] Only    Diagnostic Assessment Date: n/a  Treatment Plan Review Date: n/a  See Flowsheets for today's PHQ-9 and GENIA-7 results  Previous PHQ-9:   PHQ-9 SCORE 5/4/2017 12/6/2017 12/11/2017   Total Score 5 8 11     Previous GENIA-7:   GENIA-7 SCORE 5/4/2017 12/6/2017 12/11/2017   Total Score 3 14 20       FRANNY LEVEL:  FRANNY Score (Last Two) 12/11/2017   FRANNY Raw Score 33   Activation Score 65.8   FRANNY Level 3       DATA  Extended Session (60+ minutes): No  Interactive Complexity: Yes, visit entailed Interactive Complexity evidenced by:  -The need to manage maladaptive communication (related to, e.g., high anxiety, high reactivity, repeated questions, or disagreement) among participants that complicates delivery of care  Crisis: Yes, visit entailed Crisis Management / Stabilization requiring urgent assessment and history of the crisis state, mental status exam and disposition  [facility] Patient: No    Treatment Objective(s) Addressed in This Session:  Target Behavior(s): anxiety    Depressed Mood: Increase interest, engagement, and pleasure in doing things  Decrease frequency and intensity of feeling down, depressed, hopeless  Improve quantity and quality of night time sleep / decrease daytime naps  Feel less tired and more energy during the day   Improve diet, appetite, mindful eating, and / or meal planning  Identify negative self-talk and behaviors: challenge core beliefs, myths, and actions  Improve concentration, focus, and mindfulness in daily activities   Feel less fidgety, restless or slow in daily activities /  interpersonal interactions  Discuss motivation / ambivalence about taking anti-depressant / mood stabilizer medication(s)  Anxiety: will experience a reduction in anxiety, will develop more effective coping skills to manage anxiety symptoms, will develop healthy cognitive patterns and beliefs and will increase ability to function adaptively    Current Stressors / Issues:  Due to increased anxiety and panic disorder and complexity DA was not completed at this time. Pt had not slept in a week. He has been seen Ed twice in the last week.  Went over breathing techniques, gave him headspace and [place] mayda. Will see Doris for medication.   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 He has moved three times in three years. They lived in [place] and has been here three years. [employer] [facility] in [place]. They have 4 children.      Progress on Treatment Objective(s) / Homework:  none due to complex case at this time    Motivational Interviewing    MI Intervention: Supported Autonomy, Collaboration, Evocation and Permission to raise concern or advise     Change Talk Expressed by the Patient: Need to change    Provider Response to Change Talk: E - Evoked more info from patient about behavior change      Situation        Automatic Thoughts  Cognitive Distortions      Feelings        Behavior        Questioning Thoughts                  Care Plan review completed: No    Medication Review:  Changes to psychiatric medications, see updated Medication List in EPIC.     Medication Compliance:  Yes    Changes in Health Issues:   Yes: Sleep disturbance, Associated Psychological Distress    Chemical Use Review:   Substance Use: Chemical use reviewed, no active concerns identified      Tobacco Use: No current tobacco use.      Assessment: Current Emotional / Mental Status (status of significant symptoms):  Risk status (Self / Other harm or  suicidal ideation)  Patient denies a history of suicidal ideation, suicide attempts, self-injurious behavior, homicidal ideation, homicidal behavior and and other safety concerns  Patient denies current fears or concerns for personal safety.  Patient denies current or recent suicidal ideation or behaviors.  Patient denies current or recent homicidal ideation or behaviors.  Patient denies current or recent self injurious behavior or ideation.  Patient denies other safety concerns.  A safety and risk management plan has not been developed at this time, however patient was encouraged to call [place] [phone] should there be a change in any of these risk factors.    Appearance:   Appropriate   Eye Contact:   Good   Psychomotor Behavior: Agitated   Attitude:   Cooperative   Orientation:   All  Speech   Rate / Production: Normal    Volume:  Normal   Mood:    Anxious   Affect:    Flat   Thought Content:  Clear   Thought Form:  Coherent  Logical   Insight:    Fair     Diagnoses:  1. GENIA (generalized anxiety disorder)    2. Moderate single current episode of major depressive disorder (H)        Collateral Reports Completed:  Routed note to PCP  Communicated with: Doris    Plan: (Homework, other):  Patient was given information about behavioral services and encouraged to schedule a follow up appointment with the clinic [facility] in 1 week.  He was also given information about mental health symptoms and treatment options .  CD Recommendations: No indications of CD issues.  ANA Dominguez, [facility]      ______________________________________________________________________        ANA Barrera  December 11, 2017

## 2017-12-11 NOTE — PATIENT INSTRUCTIONS
Continue zoloft  Ativan every 6-8 hours as needed, at bedtime  Psychiatrist this week for recommendation    Doris Rodriguez CNP      .

## 2017-12-11 NOTE — NURSING NOTE
"Chief Complaint   Patient presents with     Follow Up For     [facility] ER on 12/08/17 for anxiety.       Initial /82 (BP Location: Right arm, Patient Position: Sitting, Cuff Size: Adult Large)  Pulse 88  Temp 97.6  F (36.4  C) (Oral)  Resp 16  Ht 5' 11.5\" (1.816 m)  Wt 222 lb 3.2 oz (100.8 kg)  SpO2 96%  BMI 30.56 kg/m2 Estimated body mass index is 30.56 kg/(m^2) as calculated from the following:    Height as of this encounter: 5' 11.5\" (1.816 m).    Weight as of this encounter: 222 lb 3.2 oz (100.8 kg).  Medication Reconciliation: complete    "

## 2017-12-12 ENCOUNTER — TELEPHONE (OUTPATIENT)
Dept: INTERNAL MEDICINE | Facility: CLINIC | Age: 37
End: 2017-12-12

## 2017-12-12 ASSESSMENT — ANXIETY QUESTIONNAIRES: GAD7 TOTAL SCORE: 20

## 2017-12-12 NOTE — TELEPHONE ENCOUNTER
Let pt wife know that Doris said Melatonin is ok to take with Ativan, asking recommendations on dosage 5 or 10 mg? PCP noted 5 mg is ok for Melatonin, & that Omeprazole and Lorazepam are ok to take together,pt wife was inquiring on this as well as it stated on Omeprazole box to clarify with provider. Let pt wife know this as well

## 2017-12-12 NOTE — TELEPHONE ENCOUNTER
Patient is having sleep problems onset 12/4/17, difficulty falling asleep and once he does fall asleep he wakes frequently.  Wife calling, states Suzanne recommended that pt try Melatonin along with the Ativan at HS.  Pt and wife forgot to ask PCP about this yesterday.  Is it OK to start Melatonin?  MARY LOU Degroot R.N.

## 2017-12-18 ENCOUNTER — OFFICE VISIT (OUTPATIENT)
Dept: BEHAVIORAL HEALTH | Facility: CLINIC | Age: 37
End: 2017-12-18
Payer: COMMERCIAL

## 2017-12-18 DIAGNOSIS — F41.1 GAD (GENERALIZED ANXIETY DISORDER): Primary | ICD-10-CM

## 2017-12-18 DIAGNOSIS — F32.1 MODERATE SINGLE CURRENT EPISODE OF MAJOR DEPRESSIVE DISORDER (H): ICD-10-CM

## 2017-12-18 PROCEDURE — 90791 PSYCH DIAGNOSTIC EVALUATION: CPT | Performed by: MARRIAGE & FAMILY THERAPIST

## 2017-12-18 NOTE — MR AVS SNAPSHOT
After Visit Summary   12/18/2017    Bj Benitez    MRN: [id number]           Patient Information     Date Of Birth          1980        Visit Information        Provider Department      12/18/2017 9:30 AM Suzanne Lazcano LMFT [facility]        Today's Diagnoses     GENIA (generalized anxiety disorder)    -  1    Moderate single current episode of major depressive disorder (H)           Follow-ups after your visit        Your next 10 appointments already scheduled     Dec 28, 2017 10:00 AM CST   [facility]    [address]  [facility] [address] [phone]              Who to contact     If you have questions or need follow up information about today's clinic visit or your schedule please contact [facility] directly at [phone].  Normal or non-critical lab and imaging results will be communicated to you by MyChart, letter or phone within 4 business days after the clinic has received the results. If you do not hear from us within 7 days, please contact the clinic through CloudCoverhart or phone. If you have a critical or abnormal lab result, we will notify you by phone as soon as possible.  Submit refill requests through Keystok or call your pharmacy and they will forward the refill request to us. Please allow 3 business days for your refill to be completed.          Additional Information About Your Visit        MyChart Information     Keystok gives you secure access to your electronic health record. If you see a primary care provider, you can also send messages to your care team and make appointments. If you have questions, please call your primary care clinic.  If you do not have a primary care provider, please call [phone] and they will assist you.        Care EveryWhere ID     This is your Care EveryWhere ID. This could be used by other  organizations to access your [place] medical records  [id number]         Blood Pressure from Last 3 Encounters:   12/11/17 124/82   12/08/17 135/82   12/06/17 (!) 120/96    Weight from Last 3 Encounters:   12/11/17 100.8 kg (222 lb 3.2 oz)   12/06/17 103 kg (227 lb 1.6 oz)   12/05/17 99.8 kg (220 lb)              Today, you had the following     No orders found for display       Primary Care Provider Fax #    Physician No Ref-Primary [phone]       No address on file        Equal Access to Services     [facility] : Hadii matt multani Somadiha, waaxda princeqadaha, qaybfrancia kaalmaradu agudelo, diamond rodriguez . So [facility] [phone].    ATENCIÓN: Si habla español, tiene a arizmendi disposición servicios gratuitos de asistencia lingüística. Llame al [phone].    We comply with applicable federal civil rights laws and [place] laws. We do not discriminate on the basis of race, color, national origin, age, disability, sex, sexual orientation, or gender identity.            Thank you!     Thank you for choosing [facility]  for your care. Our goal is always to provide you with excellent care. Hearing back from our patients is one way we can continue to improve our services. Please take a few minutes to complete the written survey that you may receive in the mail after your visit with us. Thank you!             Your Updated Medication List - Protect others around you: Learn how to safely use, store and throw away your medicines at www.disposemymeds.org.          This list is accurate as of: 12/18/17 10:27 AM.  Always use your most recent med list.                   Brand Name Dispense Instructions for use Diagnosis    LORazepam 1 MG tablet    ATIVAN    20 tablet    Take 1 tablet (1 mg) by mouth every 8 hours as needed for anxiety        propranolol 10 MG tablet    INDERAL    30 tablet    Take 1 tablet (10 mg) by mouth 3 times daily as needed Anxiety    GENIA (generalized anxiety  disorder)       QUEtiapine 25 MG tablet    SEROQUEL    15 tablet    Take 1-2 tablets (25-50 mg) by mouth nightly as needed        sertraline 50 MG tablet    ZOLOFT    30 tablet    Take 1 tablet (50 mg) by mouth daily    GENIA (generalized anxiety disorder), Adjustment disorder with depressed mood

## 2017-12-18 NOTE — PROGRESS NOTES
[facility]  Integrated Behavioral Health Services   Diagnostic Assessment      PATIENT'S NAME: Bj Benitez  MRN:   [id number]  :   1980  DATE OF SERVICE: 2017  SERVICE LOCATION: Face to Face in Clinic  Visit Activities: [facility] Only      Identifying Information:  Patient is a 37 year old year old, ,  male.  Patient attended the session with wife Apryl.        Referral:  Patient was referred for an assessment by Doris Rodriguez at [facility].   Reason for referral: clarify behavioral health diagnosis, determine behavioral health treatment options, assess client readiness and motivation to change, assess client social situation, address the interaction of behavioral and medical issues and consultation on complex comorbid conditions.       Patient's Statement of Presenting Concern:  Patient reports the following reason(s) for seeking an assessment at this time: increased anxiety.  Patient stated that his symptoms have resulted in the following functional impairments: childcare / parenting, management of the household and or completion of tasks, relationship(s), self-care, social interactions and use of public transportation      History of Presenting Concern:  Patient reports that these problem(s) began always having anxiety.  Over the summer things got worse. About two weeks ago things got worse. Mother-in-law was diagnosed with cancer, Hoahaoism got bigger, they had to move and conflict in the Hoahaoism. Patient has attempted to resolve these concerns in the past through: counseling, physician / PCP and psychiatry. Patient reports that other professional(s) are involved in providing support / services. Psychiatrist, PCP.       Social History:  Patient reported he grew up in [place]. They were the only child born of 1 children.  Patient reported that his childhood was chaotic, stressful. Mom has Bipolar.  Patient reported a history  of 1  marriages. Patient has been  for 13  years. Patient reported having 4children. Patient identified some stable and meaningful social connections.     Patient lives with wife and kids.  Patient is currently employed full time.  Work history     Patient reported that he has not been involved with the legal system.  Patient's highest education level was college graduate. Patient did not identify any learning problems. There are no ethnic, cultural or Christian factors that may be relevant for therapy.  Patient did not serve in the .       Mental Health History:  Patient reported the following biological family members or relatives with mental health issues: Father experienced Anxiety and Depression, Mother experienced Bipolar Disorder. Patient has not received mental health services in the past. Patient is not currently receiving any mental health services.      Chemical Health History:  Patient reported no family history of chemical health issues. Patient has not received chemical dependency treatment in the past. Patient is not currently receiving any chemical dependency treatment. Patient reports no problems as a result of their drinking / drug use.  No problems     Cage-AID score is: 0 Based on Cage-Aid score and clinical interview there  are not indications of drug or alcohol abuse.      Discussed the general effects of drugs and alcohol on health and well-being.      Significant Losses / Trauma / Abuse / Neglect Issues:  There are no indications or report of: significant losses, trauma, abuse or neglect.    Issues of possible neglect are not present.      Medical History:   Patient Active Problem List   Diagnosis     Scoliosis, unspecified scoliosis type, unspecified spinal region     CARDIOVASCULAR SCREENING; LDL GOAL LESS THAN 130     GENIA (generalized anxiety disorder)     Moderate single current episode of major depressive disorder (H)       Medication Review:  Current Outpatient  Prescriptions   Medication     propranolol (INDERAL) 10 MG tablet     QUEtiapine (SEROQUEL) 25 MG tablet     sertraline (ZOLOFT) 50 MG tablet     LORazepam (ATIVAN) 1 MG tablet     No current facility-administered medications for this visit.        Patient was provided recommendation to follow-up with physician.    Mental Status Assessment:  Appearance:   Appropriate   Eye Contact:   Good   Psychomotor Behavior: Normal   Attitude:   Cooperative   Orientation:   All  Speech   Rate / Production: Normal    Volume:  Normal   Mood:    Anxious   Affect:    Appropriate   Thought Content:  Clear   Thought Form:  Coherent  Logical   Insight:    Fair       Safety Assessment:    Patient denies a history of suicidal ideation, suicide attempts, self-injurious behavior, homicidal ideation, homicidal behavior and and other safety concerns  Patient denies current or recent suicidal ideation or behaviors.  Patient denies current or recent homicidal ideation or behaviors.  Patient denies current or recent self injurious behavior or ideation.  Patient denies other safety concerns.  Patient reports there are no firearms in the house  Protective Factors Children in the home , Sense of responsibility to family and Religiosity   Risk Factors Intense emotionality      Plan for Safety and Risk Management:  A safety and risk management plan has not been developed at this time, however patient was encouraged to call [place] [phone] should there be a change in any of these risk factors.      Review of Symptoms:  Depression: Sleep Concentration Appetite Psychomotor slowing or agitation Ruminations  Dora:  No symptoms  Psychosis: No symptoms  Anxiety: Worries Nervousness  Panic:  Tremors Shortness of Breath Sense of Impending Doom  Post Traumatic Stress Disorder: No symptoms  Obsessive Compulsive Disorder: No symptoms  Eating Disorder: No symptoms  Oppositional Defiant Disorder: No symptoms  ADD / ADHD: No symptoms  Conduct Disorder: No  symptoms    Patient's Strengths and Limitations:  Patient identified the following strengths or resources that will help him succeed in counseling: Latter day, commitment to health and well being, jordan / spirituality, friends / good social support, family support, intelligence, positive work environment and sense of humor. Patient identified the following supports: family. Things that may interfere with the patien'ts success in behavioral health services include:lack of social support.    Diagnostic Criteria:  B. The person finds it difficult to control the worry.  C. Select 3 or more symptoms (required for diagnosis). Only one item is required in children.   - Restlessness or feeling keyed up or on edge.    - Being easily fatigued.    - Difficulty concentrating or mind going blank.    - Irritability.    - Muscle tension.    - Sleep disturbance (difficulty falling or staying asleep, or restless unsatisfying sleep).   D. The focus of the anxiety and worry is not confined to features of an Axis I disorder.  E. The anxiety, worry, or physical symptoms cause clinically significant distress or impairment in social, occupational, or other important areas of functioning.   F. The disturbance is not due to the direct physiological effects of a substance (e.g., a drug of abuse, a medication) or a general medical condition (e.g., hyperthyroidism) and does not occur exclusively during a Mood Disorder, a Psychotic Disorder, or a Pervasive Developmental Disorder.   - Depressed mood. Note: In children and adolescents, can be irritable mood.     - Diminished interest or pleasure in all, or almost all, activities.    - Significant weight gainincrease in appetite.    - Decreased sleep.    - Psychomotor activity agitation.    - Fatigue or loss of energy.    - Feelings of worthlessness or excessive guilt.    - Diminished ability to think or concentrate, or indecisiveness.   B) The symptoms cause clinically significant distress or  impairment in social, occupational, or other important areas of functioning  C) The episode is not attributable to the physiological effects of a substance or to another medical condition  D) The occurence of major depressive episode is not better explained by other thought / psychotic disorders  E) There has never been a manic episode or hypomanic episode      Functional Status:  Patient's symptoms are causing reduced functional status in the following areas: Activities of Daily Living - worried  Occupational / Vocational - not working      DSM5 Diagnoses: (Sustained by DSM5 Criteria Listed Above)  Diagnoses: 296.32 (F33.1) Major Depressive Disorder, Recurrent Episode, Moderate _ and With anxious distress  300.02 (F41.1) Generalized Anxiety Disorder  Psychosocial & Contextual Factors:  Work, family, health  WHODAS Score: 30  See Media section of EPIC medical record for completed WHODAS    Preliminary Treatment Plan:    The client reports no currently identified Sikh, ethnic or cultural issues relevant to therapy.    Initial Treatment will focus on: Depressed Mood - increase coping skills  Anxiety - increase coping skills.    Chemical dependency recommendations: No indications of CD issues    As a preliminary treatment goal, patient will experience a reduction in depressed mood, will develop more effective coping skills to manage depressive symptoms, will develop healthy cognitive patterns and beliefs, will increase ability to function adaptively and will continue to take medications as prescribed / participate in supportive activities and services  and will experience a reduction in anxiety, will develop more effective coping skills to manage anxiety symptoms, will develop healthy cognitive patterns and beliefs and will increase ability to function adaptively.    The focus of initial interventions will be to alleviate anxiety and alleviate depressed mood.    The patient is receiving treatment / structured  support from the following professional(s) / service and treatment. Collaboration will be initiated with: primary care physician.    Referral to another professional/service is not indicated at this time..    A Release of Information is not needed at this time.    Report to child or adult protection services was NA.    ANA Barrera, Community Health Worker

## 2017-12-28 ENCOUNTER — OFFICE VISIT (OUTPATIENT)
Dept: BEHAVIORAL HEALTH | Facility: CLINIC | Age: 37
End: 2017-12-28
Payer: COMMERCIAL

## 2017-12-28 DIAGNOSIS — F32.1 MODERATE SINGLE CURRENT EPISODE OF MAJOR DEPRESSIVE DISORDER (H): ICD-10-CM

## 2017-12-28 DIAGNOSIS — F41.1 GAD (GENERALIZED ANXIETY DISORDER): Primary | ICD-10-CM

## 2017-12-28 PROCEDURE — 90834 PSYTX W PT 45 MINUTES: CPT | Performed by: MARRIAGE & FAMILY THERAPIST

## 2017-12-28 NOTE — MR AVS SNAPSHOT
After Visit Summary   12/28/2017    Bj Benitez    MRN: [id number]           Patient Information     Date Of Birth          1980        Visit Information        Provider Department      12/28/2017 10:00 AM Suzanne Lazcano LMFT [facility]        Today's Diagnoses     GENIA (generalized anxiety disorder)    -  1    Moderate single current episode of major depressive disorder (H)           Follow-ups after your visit        Your next 10 appointments already scheduled     Jan 04, 2018  8:15 AM CST   [facility]    [address]  [facility] [address]   [phone]           Please do not eat 10-12 hours before your appointment if you are coming in fasting for labs on lipids, cholesterol, or glucose (sugar). This does not apply to pregnant women. Water, hot tea and black coffee (with nothing added) are okay. Do not drink other fluids, diet soda or chew gum.            Jan 04, 2018  9:30 AM CST   [facility]   [facility]    [address] [facility] [address]  [facility] [address]   [phone]            Bryce 10, 2018 10:00 AM CST   Office Visit with Joe Bragg MD   [facility]    [address]  [facility] [id number] [phone]           Bring a current list of meds and any records pertaining to this visit. For Physicals, please bring immunization records and any forms needing to be filled out. Please arrive 10 minutes early to complete paperwork.              Who to contact     If you have questions or need follow up information about today's clinic visit or your schedule please contact [facility] directly at [phone].  Normal or non-critical lab and imaging results will be communicated to you by MyChart, letter or phone within 4 business days  after the clinic has received the results. If you do not hear from us within 7 days, please contact the clinic through [employer] or phone. If you have a critical or abnormal lab result, we will notify you by phone as soon as possible.  Submit refill requests through [employer] or call your pharmacy and they will forward the refill request to us. Please allow 3 business days for your refill to be completed.          Additional Information About Your Visit        [employer]DR [employer] Information     [employer] gives you secure access to your electronic health record. If you see a primary care provider, you can also send messages to your care team and make appointments. If you have questions, please call your primary care clinic.  If you do not have a primary care provider, please call [phone] and they will assist you.        Care EveryWhere ID     This is your Care EveryWhere ID. This could be used by other organizations to access your [place] medical records  [id number]         Blood Pressure from Last 3 Encounters:   12/11/17 124/82   12/08/17 135/82   12/06/17 (!) 120/96    Weight from Last 3 Encounters:   12/11/17 100.8 kg (222 lb 3.2 oz)   12/06/17 103 kg (227 lb 1.6 oz)   12/05/17 99.8 kg (220 lb)              Today, you had the following     No orders found for display       Primary Care Provider Fax #    Physician No Ref-Primary [phone]       No address on file        Equal Access to Services     STEPHANI ARRINGTON : Hadii matt multani Somadiha, waaxda luqadaha, qaybta kaalcalista agudelo, diamond rodriguez . So [facility] [phone].    ATENCIÓN: Si habla español, tiene a arizmendi disposición servicios gratuitos de asistencia lingüística. Clarita al [phone].    We comply with applicable federal civil rights laws and [place] laws. We do not discriminate on the basis of race, color, national origin, age, disability, sex, sexual orientation, or gender identity.            Thank you!     Thank you for  choosing [facility]  for your care. Our goal is always to provide you with excellent care. Hearing back from our patients is one way we can continue to improve our services. Please take a few minutes to complete the written survey that you may receive in the mail after your visit with us. Thank you!             Your Updated Medication List - Protect others around you: Learn how to safely use, store and throw away your medicines at www.disposemymeds.org.          This list is accurate as of: 12/28/17  1:15 PM.  Always use your most recent med list.                   Brand Name Dispense Instructions for use Diagnosis    LORazepam 1 MG tablet    ATIVAN    20 tablet    Take 1 tablet (1 mg) by mouth every 8 hours as needed for anxiety        propranolol 10 MG tablet    INDERAL    30 tablet    Take 1 tablet (10 mg) by mouth 3 times daily as needed Anxiety    GENIA (generalized anxiety disorder)       QUEtiapine 25 MG tablet    SEROQUEL    15 tablet    Take 1-2 tablets (25-50 mg) by mouth nightly as needed        sertraline 50 MG tablet    ZOLOFT    30 tablet    Take 1 tablet (50 mg) by mouth daily    GENIA (generalized anxiety disorder), Adjustment disorder with depressed mood

## 2017-12-28 NOTE — PROGRESS NOTES
[facility]  December 28, 2017      Behavioral Health Clinician Progress Note    Patient Name: Bj Benitez           Service Type:  Individual      Service Location:   Face to Face in Clinic     Session Start Time: 10:00  Session End Time: 10;40      Session Length: 38 - 52      Attendees: Client and Spouse / Significant Other    Visit Activities (Refresh list every visit): [facility] Only    Diagnostic Assessment Date: 12/18/17  Treatment Plan Review Date: 12/28/17  See Flowsheets for today's PHQ-9 and GENIA-7 results  Previous PHQ-9:   PHQ-9 SCORE 5/4/2017 12/6/2017 12/11/2017   Total Score 5 8 11     Previous GENIA-7:   GENIA-7 SCORE 5/4/2017 12/6/2017 12/11/2017   Total Score 3 14 20       FRANNY LEVEL:  FRANNY Score (Last Two) 12/11/2017   FRANNY Raw Score 33   Activation Score 65.8   FRANNY Level 3       DATA  Extended Session (60+ minutes): No  Interactive Complexity: No  Crisis: No  [facility] Patient: No    Treatment Objective(s) Addressed in This Session:  Target Behavior(s): anxiety, blood sugar    Depressed Mood: Increase interest, engagement, and pleasure in doing things  Decrease frequency and intensity of feeling down, depressed, hopeless  Improve quantity and quality of night time sleep / decrease daytime naps  Feel less tired and more energy during the day   Improve diet, appetite, mindful eating, and / or meal planning  Identify negative self-talk and behaviors: challenge core beliefs, myths, and actions  Improve concentration, focus, and mindfulness in daily activities   Feel less fidgety, restless or slow in daily activities / interpersonal interactions  Anxiety: will experience a reduction in anxiety, will develop more effective coping skills to manage anxiety symptoms, will develop healthy cognitive patterns and beliefs and will increase ability to function adaptively    Current Stressors / Issues:  Pt has been doing better the last few days. He stated Friday and the weekend was hard after a  death of someone close. He states he has been having low blood sugar and is worried about how this effects his anxiety. Had him make an apt with a pcp and got blood work done. Worked on setting up a routine to help with anxiety.       Progress on Treatment Objective(s) / Homework:  New Objective established this session - ACTION (Actively working towards change); Intervened by reinforcing change plan / affirming steps taken    Motivational Interviewing    MI Intervention: Expressed Empathy/Understanding, Permission to raise concern or advise and Open-ended questions     Change Talk Expressed by the Patient: Desire to change Ability to change Reasons to change    Provider Response to Change Talk: E - Evoked more info from patient about behavior change      Situation        Automatic Thoughts  Cognitive Distortions      Feelings        Behavior        Questioning Thoughts                  Care Plan review completed: Yes    Medication Review:  No changes to current psychiatric medication(s)    Medication Compliance:  Yes    Changes in Health Issues:   None reported    Chemical Use Review:   Substance Use: Chemical use reviewed, no active concerns identified      Tobacco Use: No current tobacco use.      Assessment: Current Emotional / Mental Status (status of significant symptoms):  Risk status (Self / Other harm or suicidal ideation)  Patient denies a history of suicidal ideation, suicide attempts, self-injurious behavior, homicidal ideation, homicidal behavior and and other safety concerns  Patient denies current fears or concerns for personal safety.  Patient denies current or recent suicidal ideation or behaviors.  Patient denies current or recent homicidal ideation or behaviors.  Patient denies current or recent self injurious behavior or ideation.  Patient denies other safety concerns.  A safety and risk management plan has not been developed at this time, however patient was encouraged to call [facility] Crisis /  911 should there be a change in any of these risk factors.    Appearance:   Appropriate   Eye Contact:   Good   Psychomotor Behavior: Normal   Attitude:   Cooperative   Orientation:   All  Speech   Rate / Production: Normal    Volume:  Normal   Mood:    Anxious  Sad   Affect:    Appropriate   Thought Content:  Clear   Thought Form:  Coherent  Logical   Insight:    Good     Diagnoses:  1. GENIA (generalized anxiety disorder)    2. Moderate single current episode of major depressive disorder (H)        Collateral Reports Completed:  Communicated with: Dr. Rashid    Plan: (Homework, other):  Patient was given information about behavioral services and encouraged to schedule a follow up appointment with the clinic [facility] in 1 week.  He was also given information about mental health symptoms and treatment options  and deep Breathing Strategies to practice when experiencing anxiety.  CD Recommendations: No indications of CD issues.  ANA Dominguez, [facility]      ______________________________________________________________________    Integrated Primary Care Behavioral Health Treatment Plan    Patient's Name: Bj Benitez  YOB: 1980    Date: 12/28/17    DSM-V Diagnoses: 296.32 (F33.1) Major Depressive Disorder, Recurrent Episode, Moderate _ and With anxious distress or 300.02 (F41.1) Generalized Anxiety Disorder  Psychosocial / Contextual Factors: health, family, work  WHODAS: 30    Referral / Collaboration:  The following referral(s) will be initiated: seeing  pcp.    Anticipated number of session or this episode of care: 6-8      MeasurableTreatment Goal(s) related to diagnosis / functional impairment(s)  Goal 1: Patient will increase his coping techniques to help with anxiety    I will know I've met my goal when  Less worried.      Objective #A (Patient Action)    Patient will use at least 2 coping skills for anxiety management in the next 1 weeks.  Status: New - Date: 12/28/17     Intervention(s)  [facility] will  assign homework use the mayda pacifica.    Objective #B  Patient will use relaxation strategies 2 times per day to reduce the physical symptoms of anxiety.  Status: New - Date: 12/28/17     Intervention(s)  [facility] will assign homework CBT and mindfullness skills to help.    Patient has reviewed and agreed to the above plan.      ANA Barrera  December 28, 2017

## 2018-01-02 ENCOUNTER — TELEPHONE (OUTPATIENT)
Dept: INTERNAL MEDICINE | Facility: CLINIC | Age: 38
End: 2018-01-02

## 2018-01-02 DIAGNOSIS — Z00.00 ROUTINE GENERAL MEDICAL EXAMINATION AT A HEALTH CARE FACILITY: Primary | ICD-10-CM

## 2018-01-02 NOTE — TELEPHONE ENCOUNTER
Pt calls, states that Suzanne scheduled an appt for him with Dr. Bragg on 1/10/18 and said she was going to talk to him about getting labs ordered prior to his appt. Pt has scheduled a lab only appt for this Friday, asks if lab orders can be placed.

## 2018-01-02 NOTE — TELEPHONE ENCOUNTER
Suzanne informed this RN that Dr. Bragg placed orders for pt.     Contacted pt, informed him lab orders were placed.

## 2018-01-04 ENCOUNTER — OFFICE VISIT (OUTPATIENT)
Dept: BEHAVIORAL HEALTH | Facility: CLINIC | Age: 38
End: 2018-01-04
Payer: COMMERCIAL

## 2018-01-04 DIAGNOSIS — F43.21 ADJUSTMENT DISORDER WITH DEPRESSED MOOD: ICD-10-CM

## 2018-01-04 DIAGNOSIS — F41.1 GAD (GENERALIZED ANXIETY DISORDER): ICD-10-CM

## 2018-01-04 DIAGNOSIS — F32.1 MODERATE SINGLE CURRENT EPISODE OF MAJOR DEPRESSIVE DISORDER (H): Primary | ICD-10-CM

## 2018-01-04 PROCEDURE — 90834 PSYTX W PT 45 MINUTES: CPT | Performed by: MARRIAGE & FAMILY THERAPIST

## 2018-01-04 NOTE — MR AVS SNAPSHOT
After Visit Summary   1/4/2018    Bj Benitez    MRN: [id number]           Patient Information     Date Of Birth          1980        Visit Information        Provider Department      1/4/2018 9:30 AM Suzanne Lazcano LMFT [facility]        Today's Diagnoses     Moderate single current episode of major depressive disorder (H)    -  1    GENIA (generalized anxiety disorder)           Follow-ups after your visit        Your next 10 appointments already scheduled     Jan 05, 2018  8:30 AM CST   Lab visit with [facility] LAB   [facility]    [address]  [facility] [address]   [phone]           Please do not eat 10-12 hours before your appointment if you are coming in fasting for labs on lipids, cholesterol, or glucose (sugar). Does not apply to pregnant women.  Water with medications is okay. Do not drink coffee or other fluids.  If you have concerns about taking your medications, please send a message by clicking on Secure Messaging, Message Your Care Team.            Bryce 10, 2018 10:00 AM CST   Office Visit with Joe Bragg MD   [facility]    [address]  [facility] [address]   [phone]           Bring a current list of meds and any records pertaining to this visit. For Physicals, please bring immunization records and any forms needing to be filled out. Please arrive 10 minutes early to complete paperwork.            Jan 11, 2018 10:00 AM CST   Return Visit with ANA Barrera   [facility] ([facility]    [address] [id number] [phone]              Who to contact     If you have questions or need follow up information about today's clinic visit or your schedule please contact [facility] directly at [phone].  Normal or non-critical lab and imaging  results will be communicated to you by MyChart, letter or phone within 4 business days after the clinic has received the results. If you do not hear from us within 7 days, please contact the clinic through [employer] or phone. If you have a critical or abnormal lab result, we will notify you by phone as soon as possible.  Submit refill requests through [employer] or call your pharmacy and they will forward the refill request to us. Please allow 3 business days for your refill to be completed.          Additional Information About Your Visit        [employer] Information     [employer] gives you secure access to your electronic health record. If you see a primary care provider, you can also send messages to your care team and make appointments. If you have questions, please call your primary care clinic.  If you do not have a primary care provider, please call [phone] and they will assist you.        Care EveryWhere ID     This is your Care EveryWhere ID. This could be used by other organizations to access your [facility] medical records  [id number]         Blood Pressure from Last 3 Encounters:   12/11/17 124/82   12/08/17 135/82   12/06/17 (!) 120/96    Weight from Last 3 Encounters:   12/11/17 100.8 kg (222 lb 3.2 oz)   12/06/17 103 kg (227 lb 1.6 oz)   12/05/17 99.8 kg (220 lb)              Today, you had the following     No orders found for display       Primary Care Provider Fax #    Physician No Ref-Primary [phone]       No address on file        Equal Access to Services     STEPHANI ARRINGTON : Hadii matt Mendoza, waaxda luqadaha, qaybta kaalmada magnus, diamond rodriguez . So [facility] [phone].    ATENCIÓN: Si habla español, tiene a arizmendi disposición servicios gratuitos de asistencia lingüística. Llame al [phone].    We comply with applicable federal civil rights laws and [place] laws. We do not discriminate on the basis of race, color, national origin, age, disability, sex,  sexual orientation, or gender identity.            Thank you!     Thank you for choosing [facility]  for your care. Our goal is always to provide you with excellent care. Hearing back from our patients is one way we can continue to improve our services. Please take a few minutes to complete the written survey that you may receive in the mail after your visit with us. Thank you!             Your Updated Medication List - Protect others around you: Learn how to safely use, store and throw away your medicines at www.disposemymeds.org.          This list is accurate as of: 1/4/18 10:51 AM.  Always use your most recent med list.                   Brand Name Dispense Instructions for use Diagnosis    LORazepam 1 MG tablet    ATIVAN    20 tablet    Take 1 tablet (1 mg) by mouth every 8 hours as needed for anxiety        propranolol 10 MG tablet    INDERAL    30 tablet    Take 1 tablet (10 mg) by mouth 3 times daily as needed Anxiety    GENIA (generalized anxiety disorder)       QUEtiapine 25 MG tablet    SEROQUEL    15 tablet    Take 1-2 tablets (25-50 mg) by mouth nightly as needed        sertraline 50 MG tablet    ZOLOFT    30 tablet    Take 1 tablet (50 mg) by mouth daily    GENIA (generalized anxiety disorder), Adjustment disorder with depressed mood

## 2018-01-04 NOTE — PROGRESS NOTES
[facility]  January 4, 2017      Behavioral Health Clinician Progress Note    Patient Name: Bj Benitez           Service Type:  Individual      Service Location:   Face to Face in Clinic     Session Start Time: 9:30 Session End Time: 10:15      Session Length: 38 - 52      Attendees: Client and Spouse / Significant Other    Visit Activities (Refresh list every visit): [facility] Only    Diagnostic Assessment Date: 12/18/17  Treatment Plan Review Date: 12/28/17  See Flowsheets for today's PHQ-9 and GENIA-7 results  Previous PHQ-9:   PHQ-9 SCORE 5/4/2017 12/6/2017 12/11/2017   Total Score 5 8 11     Previous GENIA-7:   GENIA-7 SCORE 5/4/2017 12/6/2017 12/11/2017   Total Score 3 14 20       FRANNY LEVEL:  FRANNY Score (Last Two) 12/11/2017   FRANNY Raw Score 33   Activation Score 65.8   FRANNY Level 3       DATA  Extended Session (60+ minutes): No  Interactive Complexity: No  Crisis: No  [facility] Patient: No    Treatment Objective(s) Addressed in This Session:  Target Behavior(s): anxiety, blood sugar    Depressed Mood: Increase interest, engagement, and pleasure in doing things  Decrease frequency and intensity of feeling down, depressed, hopeless  Improve quantity and quality of night time sleep / decrease daytime naps  Feel less tired and more energy during the day   Improve diet, appetite, mindful eating, and / or meal planning  Identify negative self-talk and behaviors: challenge core beliefs, myths, and actions  Improve concentration, focus, and mindfulness in daily activities   Feel less fidgety, restless or slow in daily activities / interpersonal interactions  Anxiety: will experience a reduction in anxiety, will develop more effective coping skills to manage anxiety symptoms, will develop healthy cognitive patterns and beliefs and will increase ability to function adaptively    Current Stressors / Issues:  Pt states he is having more good days then bad. He did state that when he does have down says he  "has is low. He stated he has had thoughts of death, but denied having a plan.  \"The thoughts come and they scare me and then I get anxious and they go away.\" talked to Doris Rodriguez who is going to increase his zoloft to 100mg. Worked on safety plan. \" If the thought got worse or I had a plan I would tell my wife Apryl.\" Pt states he was safe. Denies needing to go to the ER. Pt was given a safety numbers.       Progress on Treatment Objective(s) / Homework:  New Objective established this session - ACTION (Actively working towards change); Intervened by reinforcing change plan / affirming steps taken    Motivational Interviewing    MI Intervention: Expressed Empathy/Understanding, Permission to raise concern or advise and Open-ended questions     Change Talk Expressed by the Patient: Desire to change Ability to change Reasons to change    Provider Response to Change Talk: E - Evoked more info from patient about behavior change      Situation        Automatic Thoughts  Cognitive Distortions      Feelings        Behavior        Questioning Thoughts                  Care Plan review completed: Yes    Medication Review:  No changes to current psychiatric medication(s)    Medication Compliance:  Yes    Changes in Health Issues:   None reported    Chemical Use Review:   Substance Use: Chemical use reviewed, no active concerns identified      Tobacco Use: No current tobacco use.      Assessment: Current Emotional / Mental Status (status of significant symptoms):  Risk status (Self / Other harm or suicidal ideation)  Patient denies a history of suicidal ideation, suicide attempts, self-injurious behavior, homicidal ideation, homicidal behavior and and other safety concerns  Patient denies current fears or concerns for personal safety.  Patient reports the following current or recent suicidal ideation or behaviors: thoughts, but no plans. Will talk to wife Apryl if this changes. .Safety Numbers were given again to pt. "   Patient denies current or recent homicidal ideation or behaviors.  Patient denies current or recent self injurious behavior or ideation.  Patient denies other safety concerns.  A safety and risk management plan has been developed including: Patient consented to co-developed safety plan.  A safety and risk management plan was completed.  Patient agreed to use safety plan should any safety concerns arise.  A copy was given to the patient.  Collaborative care team was informed of patient's risk status and plan.  If you are in immediate danger, call 911.  If you or someone you know is experiencing a mental health crisis and you need help, the following crisis  hotlines are available to help.    [facility]is [facility]  [phone] TDD  Toll Free [place] [phone]  [facility] Emergency Department  Behavioral Emergency Center  [address] [place] [address]  [phone]  [facility] Crisis Response Services  (Adults & Children)  [phone]  [facility] -  Acute Psychiatric Services (APS)  Assessment & Referral: [phone]  Suicide Hotline: [phone]    [facility] Crisis Team  [phone]    [facility] Adult Mental Health Services   [phone]  Referrals: [phone]  Crisis: [phone]    [facility]    Emergency Department  [address] [place] [address]  [phone]  [place] [facility]    [phone] ([phone])  [facility] Crisis Response Unit    [phone]  [facility]  Community Outreach for Psychiatric Emergencies  [phone]  [facility] Crisis Services  [facility] Adult Mental Health Services - Crisis Services (24/7)  Information & Referrals: [phone]  Crisis Line: [phone]    [facility] Emergency Center (24/7)  [phone]  TDD      Appearance:   Appropriate   Eye Contact:   Good   Psychomotor Behavior: Normal   Attitude:   Cooperative   Orientation:   All  Speech   Rate / Production: Normal    Volume:  Normal   Mood:    Anxious  Sad   Affect:    Appropriate   Thought Content:  Clear   Thought Form:  Coherent  Logical   Insight:    Good     Diagnoses:  1. Moderate single current episode of major depressive disorder (H)    2. GENIA (generalized anxiety disorder)        Collateral Reports Completed:  Routed note to Care Team Member(s) Doris Rodriguez      Plan: (Homework, other):  Patient was given information about behavioral services and encouraged to schedule a follow up appointment with the clinic [facility] in 1 week.  He was also given information about mental health symptoms and treatment options  and deep Breathing Strategies to practice when experiencing anxiety.  CD Recommendations: No indications of CD issues.  ANA Dominguez, [facility]      ______________________________________________________________________    Integrated Primary Care Behavioral Health Treatment Plan    Patient's Name: Bj Benitez  YOB: 1980    Date: 12/28/17    DSM-V Diagnoses: 296.32 (F33.1) Major Depressive Disorder, Recurrent Episode, Moderate _ and With anxious distress or 300.02 (F41.1) Generalized Anxiety Disorder  Psychosocial / Contextual Factors: health, family, work  WHODAS: 30    Referral / Collaboration:  The following referral(s) will be initiated: seeing  pcp.    Anticipated number of session or this episode of care: 6-8      MeasurableTreatment Goal(s) related to diagnosis / functional impairment(s)  Goal 1: Patient will increase his coping techniques to help with anxiety    I will know I've met my goal when  Less worried.      Objective #A (Patient Action)    Patient will use at least 2 coping skills for anxiety management in the next 1 weeks.  Status: New - Date: 12/28/17     Intervention(s)  [facility] will assign homework use the mayda  pacifica.    Objective #B  Patient will use relaxation strategies 2 times per day to reduce the physical symptoms of anxiety.  Status: New - Date: 12/28/17     Intervention(s)  [facility] will assign homework CBT and mindfullness skills to help.    Patient has reviewed and agreed to the above plan.      ANA Barrera  December 28, 2017

## 2018-01-05 DIAGNOSIS — Z00.00 ROUTINE GENERAL MEDICAL EXAMINATION AT A HEALTH CARE FACILITY: ICD-10-CM

## 2018-01-05 DIAGNOSIS — R42 DIZZINESS: ICD-10-CM

## 2018-01-05 DIAGNOSIS — Z00.01 ENCOUNTER FOR GENERAL ADULT MEDICAL EXAMINATION WITH ABNORMAL FINDINGS: ICD-10-CM

## 2018-01-05 LAB
ALBUMIN SERPL-MCNC: 4.2 G/DL (ref 3.4–5)
ALP SERPL-CCNC: 67 U/L (ref 40–150)
ALT SERPL W P-5'-P-CCNC: 29 U/L (ref 0–70)
ANION GAP SERPL CALCULATED.3IONS-SCNC: 8 MMOL/L (ref 3–14)
AST SERPL W P-5'-P-CCNC: 12 U/L (ref 0–45)
BILIRUB SERPL-MCNC: 0.4 MG/DL (ref 0.2–1.3)
BUN SERPL-MCNC: 15 MG/DL (ref 7–30)
CALCIUM SERPL-MCNC: 9.2 MG/DL (ref 8.5–10.1)
CHLORIDE SERPL-SCNC: 103 MMOL/L (ref 94–109)
CO2 SERPL-SCNC: 28 MMOL/L (ref 20–32)
CREAT SERPL-MCNC: 1.07 MG/DL (ref 0.66–1.25)
DEPRECATED CALCIDIOL+CALCIFEROL SERPL-MC: 22 UG/L (ref 20–75)
ERYTHROCYTE [DISTWIDTH] IN BLOOD BY AUTOMATED COUNT: 13.1 % (ref 10–15)
GFR SERPL CREATININE-BSD FRML MDRD: 78 ML/MIN/1.7M2
GLUCOSE SERPL-MCNC: 93 MG/DL (ref 70–99)
HCT VFR BLD AUTO: 45.8 % (ref 40–53)
HGB BLD-MCNC: 15.2 G/DL (ref 13.3–17.7)
MCH RBC QN AUTO: 29.3 PG (ref 26.5–33)
MCHC RBC AUTO-ENTMCNC: 33.2 G/DL (ref 31.5–36.5)
MCV RBC AUTO: 88 FL (ref 78–100)
PLATELET # BLD AUTO: 322 10E9/L (ref 150–450)
POTASSIUM SERPL-SCNC: 3.8 MMOL/L (ref 3.4–5.3)
PROT SERPL-MCNC: 7.4 G/DL (ref 6.8–8.8)
RBC # BLD AUTO: 5.18 10E12/L (ref 4.4–5.9)
SODIUM SERPL-SCNC: 139 MMOL/L (ref 133–144)
TSH SERPL DL<=0.005 MIU/L-ACNC: 1.02 MU/L (ref 0.4–4)
WBC # BLD AUTO: 7.6 10E9/L (ref 4–11)

## 2018-01-05 PROCEDURE — 80053 COMPREHEN METABOLIC PANEL: CPT | Performed by: NURSE PRACTITIONER

## 2018-01-05 PROCEDURE — 85027 COMPLETE CBC AUTOMATED: CPT | Performed by: NURSE PRACTITIONER

## 2018-01-05 PROCEDURE — 82306 VITAMIN D 25 HYDROXY: CPT | Performed by: NURSE PRACTITIONER

## 2018-01-05 PROCEDURE — 36415 COLL VENOUS BLD VENIPUNCTURE: CPT | Performed by: NURSE PRACTITIONER

## 2018-01-05 PROCEDURE — 84443 ASSAY THYROID STIM HORMONE: CPT | Performed by: NURSE PRACTITIONER

## 2018-01-08 ENCOUNTER — TELEPHONE (OUTPATIENT)
Dept: INTERNAL MEDICINE | Facility: CLINIC | Age: 38
End: 2018-01-08

## 2018-01-08 NOTE — TELEPHONE ENCOUNTER
Informed Apryl (consent to communicate on file) that Doris recommends taking Extra Strength Tylenol for pain. This RN also advised pt can alternate heat and ice to back for 20 minutes every 3-4 hours, use barrier between skin and ice or heat pack. Apryl verbalizes understanding.

## 2018-01-08 NOTE — TELEPHONE ENCOUNTER
"Patients wife calling asking if can take ibuprofen for back strain. Packing and \"tweaked his back\", asking if can take Ibuprofen with other medications.     Used Micromedex to check drug interactions:   major interaction found between Sertraline and ibuprofen, moderate interactions between ibuprofen and inderal.     Routed to provider for review.  Provider please review and advise. Thank you.      "

## 2018-01-11 ENCOUNTER — OFFICE VISIT (OUTPATIENT)
Dept: BEHAVIORAL HEALTH | Facility: CLINIC | Age: 38
End: 2018-01-11
Payer: COMMERCIAL

## 2018-01-11 DIAGNOSIS — F32.1 MODERATE SINGLE CURRENT EPISODE OF MAJOR DEPRESSIVE DISORDER (H): Primary | ICD-10-CM

## 2018-01-11 DIAGNOSIS — F41.1 GAD (GENERALIZED ANXIETY DISORDER): ICD-10-CM

## 2018-01-11 PROCEDURE — 90834 PSYTX W PT 45 MINUTES: CPT | Performed by: MARRIAGE & FAMILY THERAPIST

## 2018-01-11 NOTE — MR AVS SNAPSHOT
After Visit Summary   1/11/2018    Bj Benitez    MRN: [id number]           Patient Information     Date Of Birth          1980        Visit Information        Provider Department      1/11/2018 10:00 AM Suzanne Lazcano LMFT [facility]        Care Instructions    The gifts of imperfection by Gabriela Lema  3 positives  Stop walking eggshells.          Follow-ups after your visit        Your next 10 appointments already scheduled     Jan 12, 2018  8:40 AM CST   Office Visit with Joe Bragg MD   [facility] ([facility])    [address] [place]  [facility] [id number] [phone]           Bring a current list of meds and any records pertaining to this visit. For Physicals, please bring immunization records and any forms needing to be filled out. Please arrive 10 minutes early to complete paperwork.            Jan 18, 2018  9:30 AM CST   Return Visit with ANA Barrera   [facility] ([facility])    [address]  [facility] [id number] [phone]              Who to contact     If you have questions or need follow up information about today's clinic visit or your schedule please contact [facility] directly at [phone].  Normal or non-critical lab and imaging results will be communicated to you by MyChart, letter or phone within 4 business days after the clinic has received the results. If you do not hear from us within 7 days, please contact the clinic through MyChart or phone. If you have a critical or abnormal lab result, we will notify you by phone as soon as possible.  Submit refill requests through [employer] or call your pharmacy and they will forward the refill request to us. Please allow 3 business days for your refill to be completed.          Additional Information About Your Visit        [employer] Information     [employer] gives you secure access  to your electronic health record. If you see a primary care provider, you can also send messages to your care team and make appointments. If you have questions, please call your primary care clinic.  If you do not have a primary care provider, please call [phone] and they will assist you.        Care EveryWhere ID     This is your Care EveryWhere ID. This could be used by other organizations to access your [facility] medical records  [id number]         Blood Pressure from Last 3 Encounters:   12/11/17 124/82   12/08/17 135/82   12/06/17 (!) 120/96    Weight from Last 3 Encounters:   12/11/17 100.8 kg (222 lb 3.2 oz)   12/06/17 103 kg (227 lb 1.6 oz)   12/05/17 99.8 kg (220 lb)              Today, you had the following     No orders found for display       Primary Care Provider Fax #    Physician No Ref-Primary [phone]       No address on file        Equal Access to Services     STEPHANI ARRINGTON : Hadii matt joneso Somadiha, waaxda luqadaha, qaybta kaalmada adeegyada, diamond rodriguez . So [facility] [phone].    ATENCIÓN: Si habla español, tiene a arizmendi disposición servicios gratuitos de asistencia lingüística. Llame al [phone].    We comply with applicable federal civil rights laws and [place] laws. We do not discriminate on the basis of race, color, national origin, age, disability, sex, sexual orientation, or gender identity.            Thank you!     Thank you for choosing [facility]  for your care. Our goal is always to provide you with excellent care. Hearing back from our patients is one way we can continue to improve our services. Please take a few minutes to complete the written survey that you may receive in the mail after your visit with us. Thank you!             Your Updated Medication List - Protect others around you: Learn how to safely use, store and throw away your medicines at www.disposemymeds.org.          This list is accurate as of: 1/11/18  10:47 AM.  Always use your most recent med list.                   Brand Name Dispense Instructions for use Diagnosis    LORazepam 1 MG tablet    ATIVAN    20 tablet    Take 1 tablet (1 mg) by mouth every 8 hours as needed for anxiety        propranolol 10 MG tablet    INDERAL    30 tablet    Take 1 tablet (10 mg) by mouth 3 times daily as needed Anxiety    GENIA (generalized anxiety disorder)       QUEtiapine 25 MG tablet    SEROQUEL    15 tablet    Take 1-2 tablets (25-50 mg) by mouth nightly as needed        sertraline 50 MG tablet    ZOLOFT    60 tablet    Take 2 tablets (100 mg) by mouth daily    GENIA (generalized anxiety disorder), Adjustment disorder with depressed mood

## 2018-01-11 NOTE — PROGRESS NOTES
[facility]  January 4, 2017      Behavioral Health Clinician Progress Note    Patient Name: Bj Benitez           Service Type:  Individual      Service Location:   Face to Face in Clinic     Session Start Time: 10:05 Session End Time: 10:50      Session Length: 38 - 52      Attendees: Client and Spouse / Significant Other    Visit Activities (Refresh list every visit): [facility] [place] Only    Diagnostic Assessment Date: 12/18/17  Treatment Plan Review Date: 12/28/17  See Flowsheets for today's PHQ-9 and GENIA-7 results  Previous PHQ-9:   PHQ-9 SCORE 5/4/2017 12/6/2017 12/11/2017   Total Score 5 8 11     Previous GENIA-7:   GENIA-7 SCORE 5/4/2017 12/6/2017 12/11/2017   Total Score 3 14 20       FRANNY LEVEL:  FRANNY Score (Last Two) 12/11/2017   FRANNY Raw Score 33   Activation Score 65.8   FRANNY Level 3       DATA  Extended Session (60+ minutes): No  Interactive Complexity: No  Crisis: No  [facility] Patient: No    Treatment Objective(s) Addressed in This Session:  Target Behavior(s): anxiety, blood sugar    Depressed Mood: Increase interest, engagement, and pleasure in doing things  Decrease frequency and intensity of feeling down, depressed, hopeless  Improve quantity and quality of night time sleep / decrease daytime naps  Feel less tired and more energy during the day   Improve diet, appetite, mindful eating, and / or meal planning  Identify negative self-talk and behaviors: challenge core beliefs, myths, and actions  Improve concentration, focus, and mindfulness in daily activities   Feel less fidgety, restless or slow in daily activities / interpersonal interactions  Anxiety: will experience a reduction in anxiety, will develop more effective coping skills to manage anxiety symptoms, will develop healthy cognitive patterns and beliefs and will increase ability to function adaptively    Current Stressors / Issues:  Reviewed safety plan. Pt reports SI is down and not as intense. States he is safe. Processed the   Person at work that is stalking him. Discussed PTSD. Recommended two books Stop walking on egg shells and The gift imperfection. Discussed his fear of failing.     Progress on Treatment Objective(s) / Homework:  New Objective established this session - ACTION (Actively working towards change); Intervened by reinforcing change plan / affirming steps taken    Motivational Interviewing    MI Intervention: Expressed Empathy/Understanding, Permission to raise concern or advise and Open-ended questions     Change Talk Expressed by the Patient: Desire to change Ability to change Reasons to change    Provider Response to Change Talk: E - Evoked more info from patient about behavior change      Situation        Automatic Thoughts  Cognitive Distortions      Feelings        Behavior        Questioning Thoughts                  Care Plan review completed: Yes    Medication Review:  No changes to current psychiatric medication(s)    Medication Compliance:  Yes    Changes in Health Issues:   None reported    Chemical Use Review:   Substance Use: Chemical use reviewed, no active concerns identified      Tobacco Use: No current tobacco use.      Assessment: Current Emotional / Mental Status (status of significant symptoms):  Risk status (Self / Other harm or suicidal ideation)  Patient denies a history of suicidal ideation, suicide attempts, self-injurious behavior, homicidal ideation, homicidal behavior and and other safety concerns  Patient denies current fears or concerns for personal safety.  Patient reports the following current or recent suicidal ideation or behaviors: thoughts, but no plans. Will talk to wife Apryl if this changes. .Safety Numbers were given again to pt.   Patient denies current or recent homicidal ideation or behaviors.  Patient denies current or recent self injurious behavior or ideation.  Patient denies other safety concerns.  A safety and risk management plan has been developed including: Patient  consented to co-developed safety plan.  A safety and risk management plan was completed.  Patient agreed to use safety plan should any safety concerns arise.  A copy was given to the patient.  Collaborative care team was informed of patient's risk status and plan.  If you are in immediate danger, call 911.  If you or someone you know is experiencing a mental health crisis and you need help, the following crisis  hotlines are available to help.    Crisis Connection  [phone] TDD  Toll Free [place] [phone]  [facility]    [facility] Emergency Department  Behavioral Emergency Center  [address] [place] [place] [place] [address]  [phone]  [facility] Crisis Response Services  (Adults & Children)  [phone]  [facility] -  Acute Psychiatric Services (APS)  Assessment & Referral: [phone]  Suicide Hotline: [phone]    [place] Crisis [facility]  [phone]    [facility] Adult Mental Health Services   [phone]  Referrals: [phone]  Crisis: [phone]    [facility]    Emergency Department  [address] [place] [address]  [phone]  [place] [facility]    [phone] ([phone])  [facility] Crisis Response Unit    [phone]  [facility]  Community Outreach for Psychiatric Emergencies  [phone]  [facility] Crisis Services  [facility] Adult Mental Health Services - Crisis Services (24/7)  Information & Referrals: [phone]  Crisis Line: [phone]    [facility] Emergency Center (24/7)  [phone] TDD      Appearance:   Appropriate   Eye Contact:   Good   Psychomotor Behavior: Normal   Attitude:   Cooperative   Orientation:   All  Speech   Rate / Production: Normal    Volume:  Normal   Mood:    Anxious  Sad   Affect:    Appropriate   Thought  Content:  Clear   Thought Form:  Coherent  Logical   Insight:    Good     Diagnoses:  1. Moderate single current episode of major depressive disorder (H)    2. GENIA (generalized anxiety disorder)        Collateral Reports Completed:  Routed note to Care Team Member(s) Doris Rodriguez      Plan: (Homework, other):  Patient was given information about behavioral services and encouraged to schedule a follow up appointment with the clinic [facility] in 1 week.  He was also given information about mental health symptoms and treatment options  and deep Breathing Strategies to practice when experiencing anxiety.  CD Recommendations: No indications of CD issues.  ANA Dominguez, [facility]      ______________________________________________________________________    Integrated Primary Care Behavioral Health Treatment Plan    Patient's Name: Bj Benitez  YOB: 1980    Date: 12/28/17    DSM-V Diagnoses: 296.32 (F33.1) Major Depressive Disorder, Recurrent Episode, Moderate _ and With anxious distress or 300.02 (F41.1) Generalized Anxiety Disorder  Psychosocial / Contextual Factors: health, family, work  WHODAS: 30    Referral / Collaboration:  The following referral(s) will be initiated: seeing  pcp.    Anticipated number of session or this episode of care: 6-8      MeasurableTreatment Goal(s) related to diagnosis / functional impairment(s)  Goal 1: Patient will increase his coping techniques to help with anxiety    I will know I've met my goal when  Less worried.      Objective #A (Patient Action)    Patient will use at least 2 coping skills for anxiety management in the next 1 weeks.  Status: New - Date: 12/28/17     Intervention(s)  [facility] will assign homework use the [facility].    Objective #B  Patient will use relaxation strategies 2 times per day to reduce the physical symptoms of anxiety.  Status: New - Date: 12/28/17     Intervention(s)  [facility] will assign homework CBT and mindfullness skills to help.    Patient has  reviewed and agreed to the above plan.      Suzanne Lazcano, LMFT  December 28, 2017

## 2018-01-12 ENCOUNTER — OFFICE VISIT (OUTPATIENT)
Dept: INTERNAL MEDICINE | Facility: CLINIC | Age: 38
End: 2018-01-12
Payer: COMMERCIAL

## 2018-01-12 VITALS
OXYGEN SATURATION: 96 % | DIASTOLIC BLOOD PRESSURE: 78 MMHG | TEMPERATURE: 98.1 F | BODY MASS INDEX: 30.1 KG/M2 | WEIGHT: 218.9 LBS | HEART RATE: 104 BPM | SYSTOLIC BLOOD PRESSURE: 114 MMHG

## 2018-01-12 DIAGNOSIS — F41.1 GAD (GENERALIZED ANXIETY DISORDER): Primary | ICD-10-CM

## 2018-01-12 DIAGNOSIS — F32.1 MODERATE SINGLE CURRENT EPISODE OF MAJOR DEPRESSIVE DISORDER (H): ICD-10-CM

## 2018-01-12 DIAGNOSIS — B07.0 PLANTAR WARTS: ICD-10-CM

## 2018-01-12 PROCEDURE — 99214 OFFICE O/P EST MOD 30 MIN: CPT | Performed by: INTERNAL MEDICINE

## 2018-01-12 RX ORDER — TRAZODONE HYDROCHLORIDE 50 MG/1
50 TABLET, FILM COATED ORAL AT BEDTIME
Refills: 1 | COMMUNITY
Start: 2018-01-04

## 2018-01-12 ASSESSMENT — ANXIETY QUESTIONNAIRES
1. FEELING NERVOUS, ANXIOUS, OR ON EDGE: NEARLY EVERY DAY
GAD7 TOTAL SCORE: 13
7. FEELING AFRAID AS IF SOMETHING AWFUL MIGHT HAPPEN: MORE THAN HALF THE DAYS
2. NOT BEING ABLE TO STOP OR CONTROL WORRYING: SEVERAL DAYS
IF YOU CHECKED OFF ANY PROBLEMS ON THIS QUESTIONNAIRE, HOW DIFFICULT HAVE THESE PROBLEMS MADE IT FOR YOU TO DO YOUR WORK, TAKE CARE OF THINGS AT HOME, OR GET ALONG WITH OTHER PEOPLE: SOMEWHAT DIFFICULT
3. WORRYING TOO MUCH ABOUT DIFFERENT THINGS: NEARLY EVERY DAY
6. BECOMING EASILY ANNOYED OR IRRITABLE: SEVERAL DAYS
5. BEING SO RESTLESS THAT IT IS HARD TO SIT STILL: SEVERAL DAYS

## 2018-01-12 ASSESSMENT — PATIENT HEALTH QUESTIONNAIRE - PHQ9: 5. POOR APPETITE OR OVEREATING: MORE THAN HALF THE DAYS

## 2018-01-12 NOTE — NURSING NOTE
"Chief Complaint   Patient presents with     RECHECK       Initial /78  Pulse 104  Temp 98.1  F (36.7  C) (Oral)  Wt 218 lb 14.4 oz (99.3 kg)  SpO2 96%  BMI 30.1 kg/m2 Estimated body mass index is 30.1 kg/(m^2) as calculated from the following:    Height as of 12/11/17: 5' 11.5\" (1.816 m).    Weight as of this encounter: 218 lb 14.4 oz (99.3 kg).  Medication Reconciliation: complete     Natalie Tai CMA      "

## 2018-01-12 NOTE — MR AVS SNAPSHOT
After Visit Summary   1/12/2018    Bj Benitez    MRN: [id number]           Patient Information     Date Of Birth          1980        Visit Information        Provider Department      1/12/2018 8:40 AM Joe Bragg MD [facility]        Today's Diagnoses     GENIA (generalized anxiety disorder)    -  1    Moderate single current episode of major depressive disorder (H)        Plantar warts           Follow-ups after your visit        Your next 10 appointments already scheduled     Jan 18, 2018  9:30 AM CST   [facility]   [facility]    [address] [facility] [address]  [facility] [address] [phone]              Who to contact     If you have questions or need follow up information about today's clinic visit or your schedule please contact [facility] directly at [phone].  Normal or non-critical lab and imaging results will be communicated to you by MyChart, letter or phone within 4 business days after the clinic has received the results. If you do not hear from us within 7 days, please contact the clinic through Intralignhart or phone. If you have a critical or abnormal lab result, we will notify you by phone as soon as possible.  Submit refill requests through [employer] or call your pharmacy and they will forward the refill request to us. Please allow 3 business days for your refill to be completed.          Additional Information About Your Visit        MyChart Information     [employer] gives you secure access to your electronic health record. If you see a primary care provider, you can also send messages to your care team and make appointments. If you have questions, please call your primary care clinic.  If you do not have a primary care provider, please call [phone] and they will assist you.        Care EveryWhere ID     This is your Care EveryWhere ID. This could be used  by other organizations to access your [employer] medical records  [id number]        Your Vitals Were     Pulse Temperature Pulse Oximetry BMI (Body Mass Index)          104 98.1  F (36.7  C) (Oral) 96% 30.1 kg/m2         Blood Pressure from Last 3 Encounters:   01/12/18 114/78   12/11/17 124/82   12/08/17 135/82    Weight from Last 3 Encounters:   01/12/18 218 lb 14.4 oz (99.3 kg)   12/11/17 222 lb 3.2 oz (100.8 kg)   12/06/17 227 lb 1.6 oz (103 kg)              Today, you had the following     No orders found for display       Primary Care Provider Office Phone # Fax #    Joe Bragg [phone]       [address] [facility] [address]        Equal Access to Services     [facility] : Hadii aad ku hadasho Soomaali, waaxda luqadaha, qaybta kaalmada adeegyada, diamond mcconnell hayheladio rodriguez . So [facility] [phone].    ATENCIÓN: Si habla español, tiene a arizmendi disposición servicios gratuitos de asistencia lingüística. Llame al [phone].    We comply with applicable federal civil rights laws and [place] laws. We do not discriminate on the basis of race, color, national origin, age, disability, sex, sexual orientation, or gender identity.            Thank you!     Thank you for choosing [facility]  for your care. Our goal is always to provide you with excellent care. Hearing back from our patients is one way we can continue to improve our services. Please take a few minutes to complete the written survey that you may receive in the mail after your visit with us. Thank you!             Your Updated Medication List - Protect others around you: Learn how to safely use, store and throw away your medicines at www.disposemymeds.org.          This list is accurate as of: 1/12/18  9:57 AM.  Always use your most recent med list.                   Brand Name Dispense Instructions for use Diagnosis    LORazepam 1 MG tablet    ATIVAN    20 tablet    Take 1 tablet (1 mg) by  mouth every 8 hours as needed for anxiety        propranolol 10 MG tablet    INDERAL    30 tablet    Take 1 tablet (10 mg) by mouth 3 times daily as needed Anxiety    GENIA (generalized anxiety disorder)       QUEtiapine 25 MG tablet    SEROQUEL    15 tablet    Take 1-2 tablets (25-50 mg) by mouth nightly as needed        sertraline 50 MG tablet    ZOLOFT    60 tablet    Take 2 tablets (100 mg) by mouth daily    GENIA (generalized anxiety disorder), Adjustment disorder with depressed mood       traZODone 50 MG tablet    DESYREL     Take 50 mg by mouth At Bedtime

## 2018-01-12 NOTE — PROGRESS NOTES
SUBJECTIVE:   Bj Benitez is a 37 year old male who presents to clinic today for the following health issues:    Follow up: anxiety, labs    Patient is seen for a follow up visit.  Patient recently diagnosed with GENIA and depression , a month ago.   Started on Zoloft , dose was increase a week ago from 50 to 100 mg.   Takes PRN Lorazepam. Uses it 1-2 times a day.   Has insomnia. Still has nights when he is not able to sleep, but all over is improved , on Trazodone.   Seen psychology. Relaxation , coping techniques help.   Has had anxiety symptoms for several years, but not treated. Mild depression also present, no suicidal ideation.   Has moved from [facility] about 3-4 years ago. Works as a . , has 4 children. Has increased stress related to his work- moving to a new building, growing community. Has had problems with a woman stocking him for 1.5 years, creating rumors, triggering his anxiety.   Has family history of anxiety and depression - in his father and bipolar disorder in his mother.     Concern for a wart on the right foot- great toe.     Has had recent lab work - discussed results. Normal CBC, CMP, TSH.           Problem list and histories reviewed & adjusted, as indicated.  Additional history: as documented    Patient Active Problem List   Diagnosis     Scoliosis, unspecified scoliosis type, unspecified spinal region     CARDIOVASCULAR SCREENING; LDL GOAL LESS THAN 130     GENIA (generalized anxiety disorder)     Moderate single current episode of major depressive disorder (H)     Past Surgical History:   Procedure Laterality Date     ENT SURGERY       NO HISTORY OF SURGERY         Social History   Substance Use Topics     Smoking status: Never Smoker     Smokeless tobacco: Never Used     Alcohol use No     Family History   Problem Relation Age of Onset     MENTAL ILLNESS Mother      bipolar     Myocardial Infarction Father      Anxiety Disorder Father          Current Outpatient  Prescriptions   Medication Sig Dispense Refill     traZODone (DESYREL) 50 MG tablet Take 50 mg by mouth At Bedtime  1     sertraline (ZOLOFT) 50 MG tablet Take 2 tablets (100 mg) by mouth daily 60 tablet 1     propranolol (INDERAL) 10 MG tablet Take 1 tablet (10 mg) by mouth 3 times daily as needed Anxiety 30 tablet 1     LORazepam (ATIVAN) 1 MG tablet Take 1 tablet (1 mg) by mouth every 8 hours as needed for anxiety 20 tablet 0     QUEtiapine (SEROQUEL) 25 MG tablet Take 1-2 tablets (25-50 mg) by mouth nightly as needed (Patient not taking: Reported on 12/11/2017) 15 tablet 1         Reviewed and updated as needed this visit by clinical staffTobacco  Allergies  Meds  Med Hx  Surg Hx  Fam Hx  Soc Hx      Reviewed and updated as needed this visit by Provider         ROS:  Constitutional, HEENT, cardiovascular, pulmonary, gi and gu systems are negative, except as otherwise noted.      OBJECTIVE:   /78  Pulse 104  Temp 98.1  F (36.7  C) (Oral)  Wt 218 lb 14.4 oz (99.3 kg)  SpO2 96%  BMI 30.1 kg/m2  Body mass index is 30.1 kg/(m^2).   GENERAL: healthy, alert and no distress  MS: no gross musculoskeletal defects noted, no edema  SKIN: no suspicious lesions or rashes            Right foot 1st toe medially - 3 mm wart , non tender, a callus present   PSYCH: mentation appears normal, affect normal/bright    Diagnostic Test Results:  Results for orders placed or performed in visit on 01/05/18   Vitamin D Deficiency   Result Value Ref Range    Vitamin D Deficiency screening 22 20 - 75 ug/L   **CBC with platelets FUTURE anytime   Result Value Ref Range    WBC 7.6 4.0 - 11.0 10e9/L    RBC Count 5.18 4.4 - 5.9 10e12/L    Hemoglobin 15.2 13.3 - 17.7 g/dL    Hematocrit 45.8 40.0 - 53.0 %    MCV 88 78 - 100 fl    MCH 29.3 26.5 - 33.0 pg    MCHC 33.2 31.5 - 36.5 g/dL    RDW 13.1 10.0 - 15.0 %    Platelet Count 322 150 - 450 10e9/L   **Comprehensive metabolic panel FUTURE anytime   Result Value Ref Range    Sodium  139 133 - 144 mmol/L    Potassium 3.8 3.4 - 5.3 mmol/L    Chloride 103 94 - 109 mmol/L    Carbon Dioxide 28 20 - 32 mmol/L    Anion Gap 8 3 - 14 mmol/L    Glucose 93 70 - 99 mg/dL    Urea Nitrogen 15 7 - 30 mg/dL    Creatinine 1.07 0.66 - 1.25 mg/dL    GFR Estimate 78 >60 mL/min/1.7m2    GFR Estimate If Black >90 >60 mL/min/1.7m2    Calcium 9.2 8.5 - 10.1 mg/dL    Bilirubin Total 0.4 0.2 - 1.3 mg/dL    Albumin 4.2 3.4 - 5.0 g/dL    Protein Total 7.4 6.8 - 8.8 g/dL    Alkaline Phosphatase 67 40 - 150 U/L    ALT 29 0 - 70 U/L    AST 12 0 - 45 U/L   **TSH with free T4 reflex FUTURE anytime   Result Value Ref Range    TSH 1.02 0.40 - 4.00 mU/L       ASSESSMENT/PLAN:     Problem List Items Addressed This Visit     GENIA (generalized anxiety disorder) - Primary    Moderate single current episode of major depressive disorder (H)    Relevant Medications    traZODone (DESYREL) 50 MG tablet      Other Visit Diagnoses     Plantar warts               Improved anxiety/ depression on Zoloft and Lorazepam   Continue treatment , recently increased dose to 100 mg Zoloft. Can titrate up in one month if no side effects and still symptoms  Decrease use of Lorazepam as able   Continue Trazodone. Consider Buspar for both anxiety and insomnia     Plantar wart on the right toe treated with cryotherapy/ liquid nitrogen, advised for care post procedure     Discussed results from recent lab work, all normal    Advised for scheduled exercise program     Follow-Up:in 1 month     Joe Bragg MD  [facility]

## 2018-01-13 ASSESSMENT — ANXIETY QUESTIONNAIRES: GAD7 TOTAL SCORE: 13

## 2018-01-18 ENCOUNTER — OFFICE VISIT (OUTPATIENT)
Dept: BEHAVIORAL HEALTH | Facility: CLINIC | Age: 38
End: 2018-01-18
Payer: COMMERCIAL

## 2018-01-18 DIAGNOSIS — F41.1 GAD (GENERALIZED ANXIETY DISORDER): Primary | ICD-10-CM

## 2018-01-18 DIAGNOSIS — F32.1 MODERATE SINGLE CURRENT EPISODE OF MAJOR DEPRESSIVE DISORDER (H): ICD-10-CM

## 2018-01-18 PROCEDURE — 90834 PSYTX W PT 45 MINUTES: CPT | Performed by: MARRIAGE & FAMILY THERAPIST
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
## 2018-01-18 NOTE — MR AVS SNAPSHOT
After Visit Summary   1/18/2018    Bj Benitez    MRN: [id number]           Patient Information     Date Of Birth          1980        Visit Information        Provider Department      1/18/2018 9:30 AM Suzanne Lazcano LMFT [facility]        Today's Diagnoses     GENIA (generalized anxiety disorder)    -  1    Moderate single current episode of major depressive disorder (H)           Follow-ups after your visit        Your next 10 appointments already scheduled     Mar 02, 2018  9:30 AM CST   Return Visit with ANA Barrera   [facility] ([facility]    [address]  [facility] [address] [phone]              Who to contact     If you have questions or need follow up information about today's clinic visit or your schedule please contact [facility] directly at [phone].  Normal or non-critical lab and imaging results will be communicated to you by MyChart, letter or phone within 4 business days after the clinic has received the results. If you do not hear from us within 7 days, please contact the clinic through [employer] or phone. If you have a critical or abnormal lab result, we will notify you by phone as soon as possible.  Submit refill requests through [employer] or call your pharmacy and they will forward the refill request to us. Please allow 3 business days for your refill to be completed.          Additional Information About Your Visit        MyChart Information     [employer] gives you secure access to your electronic health record. If you see a primary care provider, you can also send messages to your care team and make appointments. If you have questions, please call your primary care clinic.  If you do not have a primary care provider, please call [phone] and they will assist you.        Care EveryWhere ID     This is your Care EveryWhere ID. This could be used by other  organizations to access your [facility] medical records  [id number]         Blood Pressure from Last 3 Encounters:   01/12/18 114/78   12/11/17 124/82   12/08/17 135/82    Weight from Last 3 Encounters:   01/12/18 99.3 kg (218 lb 14.4 oz)   12/11/17 100.8 kg (222 lb 3.2 oz)   12/06/17 103 kg (227 lb 1.6 oz)              Today, you had the following     No orders found for display       Primary Care Provider Office Phone # Fax #    Joe Bragg [phone]       [address] [facility] [address]        Equal Access to Services     AYESHA ARRINGTON : Hadii matt joneso Somadiha, waaxda luqadaha, qaybta kaalmada adealyxyada, diamond rodriguez . So [facility] [phone].    ATENCIÓN: Si habla español, tiene a arizmendi disposición servicios gratuitos de asistencia lingüística. Llame al [phone].    We comply with applicable federal civil rights laws and [place] laws. We do not discriminate on the basis of race, color, national origin, age, disability, sex, sexual orientation, or gender identity.            Thank you!     Thank you for choosing [facility]  for your care. Our goal is always to provide you with excellent care. Hearing back from our patients is one way we can continue to improve our services. Please take a few minutes to complete the written survey that you may receive in the mail after your visit with us. Thank you!             Your Updated Medication List - Protect others around you: Learn how to safely use, store and throw away your medicines at www.disposemymeds.org.          This list is accurate as of: 1/18/18 10:25 AM.  Always use your most recent med list.                   Brand Name Dispense Instructions for use Diagnosis    LORazepam 1 MG tablet    ATIVAN    20 tablet    Take 1 tablet (1 mg) by mouth every 8 hours as needed for anxiety        propranolol 10 MG tablet    INDERAL    30 tablet    Take 1 tablet (10 mg) by mouth 3 times  daily as needed Anxiety    GENIA (generalized anxiety disorder)       QUEtiapine 25 MG tablet    SEROQUEL    15 tablet    Take 1-2 tablets (25-50 mg) by mouth nightly as needed        sertraline 50 MG tablet    ZOLOFT    60 tablet    Take 2 tablets (100 mg) by mouth daily    GENIA (generalized anxiety disorder), Adjustment disorder with depressed mood       traZODone 50 MG tablet    DESYREL     Take 50 mg by mouth At Bedtime

## 2018-01-19 ENCOUNTER — TELEPHONE (OUTPATIENT)
Dept: BEHAVIORAL HEALTH | Facility: CLINIC | Age: 38
End: 2018-01-19

## 2018-01-19 DIAGNOSIS — F41.9 ANXIETY: Primary | ICD-10-CM

## 2018-01-19 RX ORDER — LORAZEPAM 1 MG/1
1 TABLET ORAL EVERY 8 HOURS PRN
Qty: 20 TABLET | Refills: 0 | Status: SHIPPED | OUTPATIENT
Start: 2018-01-19

## 2018-01-19 NOTE — TELEPHONE ENCOUNTER
Wife called SABAS on file. Stating that  had dark thoughts last night. She states that he was safe and had no plan. She stated he did what we had agreed to in safety plan- let her know. She wanted to let me know. States that he is safe. She states that they are having trouble getting a hold of the psychiatrist that prescribed his ativan. She has not been returning his calls for the last 2 days to get a refill. He does not see her until the end of February and has no earlier apts.  He has 6 left. They were wondering if Dr. Armstrong would refill the prescription. Told Apryl I could send this message, but that it was up to Dr. Bragg. Informed her that I also told her that she might not hear back until Monday.y She stated she understood.

## 2018-01-19 NOTE — TELEPHONE ENCOUNTER
Called wife, informed of message below from provider.  Would like faxed to [facility] [employer] in [place]. Faxed Rx as requested.

## 2018-01-25 ENCOUNTER — TELEPHONE (OUTPATIENT)
Dept: BEHAVIORAL HEALTH | Facility: CLINIC | Age: 38
End: 2018-01-25
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
## 2018-01-25 NOTE — TELEPHONE ENCOUNTER
Reason for Call:  Other prescription    Detailed comments: Pt has medication questions.    Phone Number Patient can be reached at: Home number on file [phone] (home)    Best Time: any    Can we leave a detailed message on this number? YES    Call taken on 1/25/2018 at 2:56 PM by Jamila Steel

## 2018-01-26 ENCOUNTER — TELEPHONE (OUTPATIENT)
Dept: BEHAVIORAL HEALTH | Facility: CLINIC | Age: 38
End: 2018-01-26

## 2018-01-26 DIAGNOSIS — F43.21 ADJUSTMENT DISORDER WITH DEPRESSED MOOD: ICD-10-CM

## 2018-01-26 DIAGNOSIS — F41.1 GAD (GENERALIZED ANXIETY DISORDER): ICD-10-CM

## 2018-01-26 NOTE — TELEPHONE ENCOUNTER
Apryl called and said that they can be seen by [facility] and she will schedule it. She states that everything is better today and there are no safety concerns.

## 2018-01-29 ENCOUNTER — TELEPHONE (OUTPATIENT)
Dept: BEHAVIORAL HEALTH | Facility: CLINIC | Age: 38
End: 2018-01-29

## 2018-01-29 NOTE — TELEPHONE ENCOUNTER
Apryl Ward a message on my voicemail stating that Bj was struggling this weekend and has anxiety about a meeting he has today. LM for them and told them if they have any concerns they need to go to ER or call 911. Told them I was in session for the rest of the day.

## 2018-01-31 ENCOUNTER — TELEPHONE (OUTPATIENT)
Dept: BEHAVIORAL HEALTH | Facility: CLINIC | Age: 38
End: 2018-01-31

## 2018-01-31 DIAGNOSIS — F41.1 GAD (GENERALIZED ANXIETY DISORDER): ICD-10-CM

## 2018-01-31 DIAGNOSIS — F32.1 MODERATE SINGLE CURRENT EPISODE OF MAJOR DEPRESSIVE DISORDER (H): Primary | ICD-10-CM

## 2018-01-31 NOTE — TELEPHONE ENCOUNTER
Told pt I would put an order in for for [facility]HP for psychiatrist and Apryl reports that he is doing well.

## 2018-02-01 ENCOUNTER — TELEPHONE (OUTPATIENT)
Dept: BEHAVIORAL HEALTH | Facility: CLINIC | Age: 38
End: 2018-02-01

## 2018-02-01 NOTE — TELEPHONE ENCOUNTER
----- Message from Tru Cantrell sent at 2/1/2018  9:38 AM CST -----  Regarding: MENTAL HEALTH ORDER   Patient was contacted by PATRICK. Patient was scheduled for medication management services with SAMIRA FRAGA on 2.1.18 at 2PM.      Tru Cantrell  , PATRICK.

## 2018-02-01 NOTE — TELEPHONE ENCOUNTER
"Requested Prescriptions   Pending Prescriptions Disp Refills     sertraline (ZOLOFT) 50 MG tablet [Pharmacy Med Name: SERTRALINE HCL 50 MG TABLET] 30 tablet 1     Sig: TAKE 1 TABLET (50 MG) BY MOUTH DAILY    SSRIs Protocol Failed    1/26/2018  1:03 AM       Failed - PHQ-9 score less than 5 in past 6 months    The PHQ-9 criteria is meant to fail. It requires a PHQ-9 score review         Passed - Recent or future visit with authorizing provider    Patient had office visit in the last year or has a visit in the next 30 days with authorizing provider.  See \"Patient Info\" tab in inbasket, or \"Choose Columns\" in Meds & Orders section of the refill encounter.            Passed - Patient is age 18 or older       Passed - Recent (6 mo) or future visit with authorizing provider's specialty    Patient had office visit in the last 6 months or has a visit in the next 30 days with authorizing provider.  See \"Patient Info\" tab in inbasket, or \"Choose Columns\" in Meds & Orders section of the refill encounter.              PHQ-9 score:    PHQ-9 SCORE 12/11/2017   Total Score 11       Routing refill request to provider for review/approval because:  Recent phq9 outside SO parameters.    Please advise, thanks.                "

## 2018-02-02 ENCOUNTER — OFFICE VISIT (OUTPATIENT)
Dept: BEHAVIORAL HEALTH | Facility: CLINIC | Age: 38
End: 2018-02-02
Payer: COMMERCIAL

## 2018-02-02 DIAGNOSIS — F41.1 GAD (GENERALIZED ANXIETY DISORDER): Primary | ICD-10-CM

## 2018-02-02 DIAGNOSIS — F32.1 MODERATE SINGLE CURRENT EPISODE OF MAJOR DEPRESSIVE DISORDER (H): ICD-10-CM

## 2018-02-02 PROCEDURE — 90837 PSYTX W PT 60 MINUTES: CPT | Performed by: MARRIAGE & FAMILY THERAPIST

## 2018-02-02 NOTE — MR AVS SNAPSHOT
After Visit Summary   2/2/2018    Bj Benitez    MRN: [id number]           Patient Information     Date Of Birth          1980        Visit Information        Provider Department      2/2/2018 9:30 AM Suzanne Lazcano LMFT [facility]        Today's Diagnoses     GENIA (generalized anxiety disorder)    -  1    Moderate single current episode of major depressive disorder (H)           Follow-ups after your visit        Your next 10 appointments already scheduled     Feb 16, 2018 10:00 AM CST   [facility]NA [facility]rera   [facility]    [address]  [facility] [address]   [phone]            Mar 02, 2018  9:30 AM CST   [facility] Visit with ANA Barrera   [facility]    [address]  [facility] [address]   [phone]              Who to contact     If you have questions or need follow up information about today's clinic visit or your schedule please contact [facility] directly at [phone].  Normal or non-critical lab and imaging results will be communicated to you by MyChart, letter or phone within 4 business days after the clinic has received the results. If you do not hear from us within 7 days, please contact the clinic through [employer] or phone. If you have a critical or abnormal lab result, we will notify you by phone as soon as possible.  Submit refill requests through [employer] or call your pharmacy and they will forward the refill request to us. Please allow 3 business days for your refill to be completed.          Additional Information About Your Visit        MyChart Information     [employer] gives you secure access to your electronic health record. If you see a primary care provider, you can also send messages to your care team and make appointments. If you have questions, please call your primary  care clinic.  If you do not have a primary care provider, please call [phone] and they will assist you.        Care EveryWhere ID     This is your Care EveryWhere ID. This could be used by other organizations to access your [place] medical records  [id number]         Blood Pressure from Last 3 Encounters:   01/12/18 114/78   12/11/17 124/82   12/08/17 135/82    Weight from Last 3 Encounters:   01/12/18 99.3 kg (218 lb 14.4 oz)   12/11/17 100.8 kg (222 lb 3.2 oz)   12/06/17 103 kg (227 lb 1.6 oz)              Today, you had the following     No orders found for display       Primary Care Provider Office Phone # Fax #    Joe Bragg [phone]       [address] [facility] [address]        Equal Access to Services     [facility]: Hadii aad ku hadasho Soomaali, waaxda luqadaha, qaybta kaalmada adeegyada, waxay enedina hayerickn vilma rodriguez . So [facility] [phone].    ATENCIÓN: Si habla español, tiene a arizmendi disposición servicios gratuitos de asistencia lingüística. Llame al [phone].    We comply with applicable federal civil rights laws and [place] laws. We do not discriminate on the basis of race, color, national origin, age, disability, sex, sexual orientation, or gender identity.            Thank you!     Thank you for choosing [facility]  for your care. Our goal is always to provide you with excellent care. Hearing back from our patients is one way we can continue to improve our services. Please take a few minutes to complete the written survey that you may receive in the mail after your visit with us. Thank you!             Your Updated Medication List - Protect others around you: Learn how to safely use, store and throw away your medicines at www.disposemymeds.org.          This list is accurate as of 2/2/18 11:30 AM.  Always use your most recent med list.                   Brand Name Dispense Instructions for use Diagnosis    LORazepam 1 MG  tablet    ATIVAN    20 tablet    Take 1 tablet (1 mg) by mouth every 8 hours as needed for anxiety    Anxiety       propranolol 10 MG tablet    INDERAL    30 tablet    Take 1 tablet (10 mg) by mouth 3 times daily as needed Anxiety    GENIA (generalized anxiety disorder)       QUEtiapine 25 MG tablet    SEROQUEL    15 tablet    Take 1-2 tablets (25-50 mg) by mouth nightly as needed        * sertraline 50 MG tablet    ZOLOFT    60 tablet    Take 2 tablets (100 mg) by mouth daily    GENIA (generalized anxiety disorder), Adjustment disorder with depressed mood       * sertraline 50 MG tablet    ZOLOFT    30 tablet    TAKE 1 TABLET (50 MG) BY MOUTH DAILY    GENIA (generalized anxiety disorder), Adjustment disorder with depressed mood       traZODone 50 MG tablet    DESYREL     Take 50 mg by mouth At Bedtime        * Notice:  This list has 2 medication(s) that are the same as other medications prescribed for you. Read the directions carefully, and ask your doctor or other care provider to review them with you.

## 2018-02-02 NOTE — PROGRESS NOTES
[facility]  February 2, 2018      Behavioral Health Clinician Progress Note    Patient Name: Bj Benitez           Service Type:  Individual      Service Location:   Face to Face in Clinic     Session Start Time: 9:40 Session End Time: 10;50      Session Length: 53 - 60      Attendees: Client and Spouse / Significant Other    Visit Activities (Refresh list every visit): [facility] Only    Diagnostic Assessment Date: 12/18/17  Treatment Plan Review Date: 3/28/18  See Flowsheets for today's PHQ-9 and GENIA-7 results  Previous PHQ-9:   PHQ-9 SCORE 5/4/2017 12/6/2017 12/11/2017   Total Score 5 8 11     Previous GENIA-7:   GENIA-7 SCORE 12/6/2017 12/11/2017 1/12/2018   Total Score 14 20 13       FRANNY LEVEL:  FRANNY Score (Last Two) 12/11/2017   FRANNY Raw Score 33   Activation Score 65.8   FRANNY Level 3       DATA  Extended Session (60+ minutes): No  Interactive Complexity: No  Crisis: No  [facility] Patient: No    Treatment Objective(s) Addressed in This Session:  Target Behavior(s): anxiety, blood sugar    Depressed Mood: Increase interest, engagement, and pleasure in doing things  Decrease frequency and intensity of feeling down, depressed, hopeless  Improve quantity and quality of night time sleep / decrease daytime naps  Feel less tired and more energy during the day   Improve diet, appetite, mindful eating, and / or meal planning  Identify negative self-talk and behaviors: challenge core beliefs, myths, and actions  Improve concentration, focus, and mindfulness in daily activities   Feel less fidgety, restless or slow in daily activities / interpersonal interactions  Anxiety: will experience a reduction in anxiety, will develop more effective coping skills to manage anxiety symptoms, will develop healthy cognitive patterns and beliefs and will increase ability to function adaptively    Current Stressors / Issues:  Processed the stress over the last week and how he has been coping. Pt reports that he is safe.  He has seen a psychiatrist and his trazodone was increased and added buspar. Discussed what moving on and forgiveness looks like.          Progress on Treatment Objective(s) / Homework:  Stable - MAINTENANCE (Working to maintain change, with risk of relapse); Intervened by continuing to positively reinforce healthy behavior choice     Motivational Interviewing    MI Intervention: Expressed Empathy/Understanding, Permission to raise concern or advise and Open-ended questions     Change Talk Expressed by the Patient: Desire to change Ability to change Reasons to change    Provider Response to Change Talk: E - Evoked more info from patient about behavior change      Situation        Automatic Thoughts  Cognitive Distortions      Feelings        Behavior        Questioning Thoughts                  Care Plan review completed: Yes    Medication Review:  No changes to current psychiatric medication(s)    Medication Compliance:  Yes    Changes in Health Issues:   None reported    Chemical Use Review:   Substance Use: Chemical use reviewed, no active concerns identified      Tobacco Use: No current tobacco use.      Assessment: Current Emotional / Mental Status (status of significant symptoms):  Risk status (Self / Other harm or suicidal ideation)  Patient denies a history of suicidal ideation, suicide attempts, self-injurious behavior, homicidal ideation, homicidal behavior and and other safety concerns  Patient denies current fears or concerns for personal safety.  Patient reports the following current or recent suicidal ideation or behaviors: thoughts, but no plans. Will talk to wife Apryl if this changes. .Safety Numbers were given again to pt.   Patient denies current or recent homicidal ideation or behaviors.  Patient denies current or recent self injurious behavior or ideation.  Patient denies other safety concerns.  A safety and risk management plan has been developed including: Patient consented to co-developed  safety plan.  A safety and risk management plan was completed.  Patient agreed to use safety plan should any safety concerns arise.  A copy was given to the patient.  Collaborative care team was informed of patient's risk status and plan.  If you are in immediate danger, call 911.  If you or someone you know is experiencing a mental health crisis and you need help, the following crisis  hotlines are available to help.    Crisis Connection  [phone] TDD  Toll Free [place] [phone]  [facility] Emergency Department  Behavioral Emergency Center  [address] [place] [place] [place] [address]  [phone]  [facility] Crisis Response Services  (Adults & Children)  [phone]  [facility] -  Acute Psychiatric Services (APS)  Assessment & Referral: [phone]  Suicide Hotline: [phone]    [facility] Crisis Team  [phone]    [facility] Adult Mental Health Services   [phone]  Referrals: [phone]  Crisis: [phone]    [facility]    Emergency Department  [address] [place] [address]  [phone]  [place] [facility]    [phone] ([phone])  [facility] Crisis Response Unit    [phone]  [facility]  Community Outreach for Psychiatric Emergencies  [phone]  [facility] Crisis Services  [facility] Adult Mental Health Services - Crisis Services (24/7)  Information & Referrals: [phone]  Crisis Line: [phone]    [facility] (24/7)  [phone] TDD      Appearance:   Appropriate   Eye Contact:   Good   Psychomotor Behavior: Normal   Attitude:   Cooperative   Orientation:   All  Speech   Rate / Production: Normal    Volume:  Normal   Mood:    Anxious  Sad   Affect:    Appropriate   Thought Content:  Clear   Thought  Form:  Coherent  Logical   Insight:    Good     Diagnoses:  1. GENIA (generalized anxiety disorder)    2. Moderate single current episode of major depressive disorder (H)        Collateral Reports Completed:  Routed note to Care Team Member(s) Doris Rodriguez      Plan: (Homework, other):  Patient was given information about behavioral services and encouraged to schedule a follow up appointment with the clinic [facility] in 1 week.  He was also given information about mental health symptoms and treatment options  and deep Breathing Strategies to practice when experiencing anxiety.  CD Recommendations: No indications of CD issues.  ANA Dominguez, [facility]      ______________________________________________________________________    Integrated Primary Care Behavioral Health Treatment Plan    Patient's Name: Bj Benitez  YOB: 1980    Date: 12/28/17    DSM-V Diagnoses: 296.32 (F33.1) Major Depressive Disorder, Recurrent Episode, Moderate _ and With anxious distress or 300.02 (F41.1) Generalized Anxiety Disorder  Psychosocial / Contextual Factors: health, family, work  WHODAS: 30    Referral / Collaboration:  The following referral(s) will be initiated: seeing  pcp.    Anticipated number of session or this episode of care: 6-8      MeasurableTreatment Goal(s) related to diagnosis / functional impairment(s)  Goal 1: Patient will increase his coping techniques to help with anxiety    I will know I've met my goal when  Less worried.      Objective #A (Patient Action)    Patient will use at least 2 coping skills for anxiety management in the next 1 weeks.  Status: New - Date: 12/28/17     Intervention(s)  [facility] will assign homework use the mayda pacifica.    Objective #B  Patient will use relaxation strategies 2 times per day to reduce the physical symptoms of anxiety.  Status: New - Date: 12/28/17     Intervention(s)  [facility] will assign homework CBT and mindfullness skills to help.    Patient has reviewed and agreed to  the above plan.      Suzanne Lazcano, LMFT  December 28, 2017

## 2018-02-16 ENCOUNTER — OFFICE VISIT (OUTPATIENT)
Dept: BEHAVIORAL HEALTH | Facility: CLINIC | Age: 38
End: 2018-02-16
Payer: COMMERCIAL

## 2018-02-16 DIAGNOSIS — F41.1 GAD (GENERALIZED ANXIETY DISORDER): ICD-10-CM

## 2018-02-16 DIAGNOSIS — F32.1 MODERATE SINGLE CURRENT EPISODE OF MAJOR DEPRESSIVE DISORDER (H): Primary | ICD-10-CM

## 2018-02-16 PROCEDURE — 90834 PSYTX W PT 45 MINUTES: CPT | Performed by: MARRIAGE & FAMILY THERAPIST

## 2018-02-16 NOTE — MR AVS SNAPSHOT
After Visit Summary   2/16/2018    Bj Benitez    MRN: [id number]           Patient Information     Date Of Birth          1980        Visit Information        Provider Department      2/16/2018 10:00 AM Suzanne Lazcano LMFT [facility]        Today's Diagnoses     Moderate single current episode of major depressive disorder (H)    -  1    GENIA (generalized anxiety disorder)           Follow-ups after your visit        Your next 10 appointments already scheduled     Mar 02, 2018  9:30 AM CST   Return Visit with ANA Barrera   [facility] ([facility])    [address]  [facility] [address] [phone]              Who to contact     If you have questions or need follow up information about today's clinic visit or your schedule please contact [facility] directly at [phone].  Normal or non-critical lab and imaging results will be communicated to you by MyChart, letter or phone within 4 business days after the clinic has received the results. If you do not hear from us within 7 days, please contact the clinic through [employer] or phone. If you have a critical or abnormal lab result, we will notify you by phone as soon as possible.  Submit refill requests through [employer] or call your pharmacy and they will forward the refill request to us. Please allow 3 business days for your refill to be completed.          Additional Information About Your Visit        MyChart Information     [employer] gives you secure access to your electronic health record. If you see a primary care provider, you can also send messages to your care team and make appointments. If you have questions, please call your primary care clinic.  If you do not have a primary care provider, please call [phone] and they will assist you.        Care EveryWhere ID     This is your Care EveryWhere ID. This could be used by other  organizations to access your [place] medical records  [id number]         Blood Pressure from Last 3 Encounters:   01/12/18 114/78   12/11/17 124/82   12/08/17 135/82    Weight from Last 3 Encounters:   01/12/18 99.3 kg (218 lb 14.4 oz)   12/11/17 100.8 kg (222 lb 3.2 oz)   12/06/17 103 kg (227 lb 1.6 oz)              Today, you had the following     No orders found for display       Primary Care Provider Office Phone # Fax #    Joe Bragg [phone]       [address] [facility] [address]        Equal Access to Services     AYESHA ARRINGTON : Hadii matt joneso Somadiha, waaxda luqadaha, qaybta kaalmada adealyxyada, diamond rodriguez . So [facility] [phone].    ATENCIÓN: Si habla español, tiene a arizmendi disposición servicios gratuitos de asistencia lingüística. Llame al [phone].    We comply with applicable federal civil rights laws and [place] laws. We do not discriminate on the basis of race, color, national origin, age, disability, sex, sexual orientation, or gender identity.            Thank you!     Thank you for choosing [facility]  for your care. Our goal is always to provide you with excellent care. Hearing back from our patients is one way we can continue to improve our services. Please take a few minutes to complete the written survey that you may receive in the mail after your visit with us. Thank you!             Your Updated Medication List - Protect others around you: Learn how to safely use, store and throw away your medicines at www.disposemymeds.org.          This list is accurate as of 2/16/18 11:27 AM.  Always use your most recent med list.                   Brand Name Dispense Instructions for use Diagnosis    LORazepam 1 MG tablet    ATIVAN    20 tablet    Take 1 tablet (1 mg) by mouth every 8 hours as needed for anxiety    Anxiety       propranolol 10 MG tablet    INDERAL    30 tablet    Take 1 tablet (10 mg) by mouth 3  times daily as needed Anxiety    GENIA (generalized anxiety disorder)       QUEtiapine 25 MG tablet    SEROQUEL    15 tablet    Take 1-2 tablets (25-50 mg) by mouth nightly as needed        * sertraline 50 MG tablet    ZOLOFT    60 tablet    Take 2 tablets (100 mg) by mouth daily    GENIA (generalized anxiety disorder), Adjustment disorder with depressed mood       * sertraline 50 MG tablet    ZOLOFT    30 tablet    TAKE 1 TABLET (50 MG) BY MOUTH DAILY    GENIA (generalized anxiety disorder), Adjustment disorder with depressed mood       traZODone 50 MG tablet    DESYREL     Take 50 mg by mouth At Bedtime        * Notice:  This list has 2 medication(s) that are the same as other medications prescribed for you. Read the directions carefully, and ask your doctor or other care provider to review them with you.

## 2018-02-16 NOTE — PROGRESS NOTES
[facility]  February 16, 2018      Behavioral Health Clinician Progress Note    Patient Name: Bj Benitez           Service Type:  Individual      Service Location:   Face to Face in Clinic     Session Start Time: 10;00 Session End Time: 10;40      Session Length: 38 - 52      Attendees: Client and Spouse / Significant Other    Visit Activities (Refresh list every visit): [facility] Only    Diagnostic Assessment Date: 12/18/17  Treatment Plan Review Date: 3/28/18  See Flowsheets for today's PHQ-9 and GENIA-7 results  Previous PHQ-9:   PHQ-9 SCORE 5/4/2017 12/6/2017 12/11/2017   Total Score 5 8 11     Previous GENIA-7:   GENIA-7 SCORE 12/6/2017 12/11/2017 1/12/2018   Total Score 14 20 13       FRANNY LEVEL:  FRANNY Score (Last Two) 12/11/2017   FRANNY Raw Score 33   Activation Score 65.8   FRANNY Level 3       DATA  Extended Session (60+ minutes): No  Interactive Complexity: No  Crisis: No  [facility] Patient: No    Treatment Objective(s) Addressed in This Session:  Target Behavior(s): anxiety, blood sugar    Depressed Mood: Increase interest, engagement, and pleasure in doing things  Decrease frequency and intensity of feeling down, depressed, hopeless  Improve quantity and quality of night time sleep / decrease daytime naps  Feel less tired and more energy during the day   Improve diet, appetite, mindful eating, and / or meal planning  Identify negative self-talk and behaviors: challenge core beliefs, myths, and actions  Improve concentration, focus, and mindfulness in daily activities   Feel less fidgety, restless or slow in daily activities / interpersonal interactions  Anxiety: will experience a reduction in anxiety, will develop more effective coping skills to manage anxiety symptoms, will develop healthy cognitive patterns and beliefs and will increase ability to function adaptively    Current Stressors / Issues:  Pt reports that he is doing better. Denies SI. Feels like he had an effect on the [employer]. Told  him to talk to the psychtrist who prescribed it. Worked on pro/cons of staying here for taking the new job he as offered.       Progress on Treatment Objective(s) / Homework:  Stable - MAINTENANCE (Working to maintain change, with risk of relapse); Intervened by continuing to positively reinforce healthy behavior choice     Motivational Interviewing    MI Intervention: Expressed Empathy/Understanding, Permission to raise concern or advise and Open-ended questions     Change Talk Expressed by the Patient: Desire to change Ability to change Reasons to change    Provider Response to Change Talk: E - Evoked more info from patient about behavior change      Situation        Automatic Thoughts  Cognitive Distortions      Feelings        Behavior        Questioning Thoughts                  Care Plan review completed: Yes    Medication Review:  No changes to current psychiatric medication(s)    Medication Compliance:  Yes    Changes in Health Issues:   None reported    Chemical Use Review:   Substance Use: Chemical use reviewed, no active concerns identified      Tobacco Use: No current tobacco use.      Assessment: Current Emotional / Mental Status (status of significant symptoms):  Risk status (Self / Other harm or suicidal ideation)  Patient denies a history of suicidal ideation, suicide attempts, self-injurious behavior, homicidal ideation, homicidal behavior and and other safety concerns  Patient denies current fears or concerns for personal safety.  Patient reports the following current or recent suicidal ideation or behaviors: thoughts, but no plans. Will talk to kelvin Pink if this changes. .Safety Numbers were given again to pt.   Patient denies current or recent homicidal ideation or behaviors.  Patient denies current or recent self injurious behavior or ideation.  Patient denies other safety concerns.  A safety and risk management plan has been developed including: Patient consented to co-developed safety plan.   A safety and risk management plan was completed.  Patient agreed to use safety plan should any safety concerns arise.  A copy was given to the patient.  Collaborative care team was informed of patient's risk status and plan.  If you are in immediate danger, call 911.  If you or someone you know is experiencing a mental health crisis and you need help, the following crisis  hotlines are available to help.    Crisis Connection  [phone] TDD  Toll Free [place] [phone]  [facility]    [facility] Emergency Department  Behavioral Emergency Center  [address] [place] [address]  [phone]  [facility] Crisis Response Services  (Adults & Children)  [phone]  [facility] -  Acute Psychiatric Services (APS)  Assessment & Referral: [phone]  Suicide Hotline: [phone]    [facility] Crisis Team  [phone]    [facility] Adult Mental Health Services   [phone]  Referrals: [phone]  Crisis: [phone]    [facility]    Emergency Department  [address] [place] [address]  [phone]  [place] [employer]    [phone]-[phone] ([phone])  [facility] Crisis Response Unit    [phone]  [facility]  Community Outreach for Psychiatric Emergencies  [phone]  [facility] Crisis Services  [facility] Adult Mental Health Services - Crisis Services (24/7)  Information & Referrals: [phone]  Crisis Line: [phone]    [facility] (24/7)  [phone] TDD      Appearance:   Appropriate   Eye Contact:   Good   Psychomotor Behavior: Normal   Attitude:   Cooperative   Orientation:   All  Speech   Rate / Production: Normal    Volume:  Normal   Mood:    Anxious  Sad   Affect:    Appropriate   Thought Content:  Clear   Thought Form:  Coherent   Logical   Insight:    Good     Diagnoses:  No diagnosis found.    Collateral Reports Completed:  Routed note to Care Team Member(s) Doris Rodriguez      Plan: (Homework, other):  Patient was given information about behavioral services and encouraged to schedule a follow up appointment with the clinic [facility] in 1 week.  He was also given information about mental health symptoms and treatment options  and deep Breathing Strategies to practice when experiencing anxiety.  CD Recommendations: No indications of CD issues.  ANA Dominguez, [facility]      ______________________________________________________________________    Integrated Primary Care Behavioral Health Treatment Plan    Patient's Name: Bj Benitez  YOB: 1980    Date: 12/28/17    DSM-V Diagnoses: 296.32 (F33.1) Major Depressive Disorder, Recurrent Episode, Moderate _ and With anxious distress or 300.02 (F41.1) Generalized Anxiety Disorder  Psychosocial / Contextual Factors: health, family, work  WHODAS: 30    Referral / Collaboration:  The following referral(s) will be initiated: seeing  pcp.    Anticipated number of session or this episode of care: 6-8      MeasurableTreatment Goal(s) related to diagnosis / functional impairment(s)  Goal 1: Patient will increase his coping techniques to help with anxiety    I will know I've met my goal when  Less worried.      Objective #A (Patient Action)    Patient will use at least 2 coping skills for anxiety management in the next 1 weeks.  Status: New - Date: 12/28/17     Intervention(s)  [facility] will assign homework use the mayda pacifica.    Objective #B  Patient will use relaxation strategies 2 times per day to reduce the physical symptoms of anxiety.  Status: New - Date: 12/28/17     Intervention(s)  [facility] will assign homework CBT and mindfullness skills to help.    Patient has reviewed and agreed to the above plan.      ANA Barrera  December 28, 2017

## 2018-03-02 ENCOUNTER — OFFICE VISIT (OUTPATIENT)
Dept: BEHAVIORAL HEALTH | Facility: CLINIC | Age: 38
End: 2018-03-02
Payer: COMMERCIAL

## 2018-03-02 DIAGNOSIS — F41.1 GAD (GENERALIZED ANXIETY DISORDER): Primary | ICD-10-CM

## 2018-03-02 DIAGNOSIS — F32.1 MODERATE SINGLE CURRENT EPISODE OF MAJOR DEPRESSIVE DISORDER (H): ICD-10-CM

## 2018-03-02 PROCEDURE — 90832 PSYTX W PT 30 MINUTES: CPT | Performed by: MARRIAGE & FAMILY THERAPIST

## 2018-03-02 NOTE — PROGRESS NOTES
[place]  March 2, 2018      Behavioral Health Clinician Progress Note    Patient Name: jB Benitez           Service Type:  Individual      Service Location:   Face to Face in Clinic     Session Start Time: 9;40 Session End Time: 10:15      Session Length: 16 - 37      Attendees: Client and Spouse / Significant Other    Visit Activities (Refresh list every visit): [facility] Only    Diagnostic Assessment Date: 12/18/17  Treatment Plan Review Date: 3/28/18  See Flowsheets for today's PHQ-9 and GENIA-7 results  Previous PHQ-9:   PHQ-9 SCORE 5/4/2017 12/6/2017 12/11/2017   Total Score 5 8 11     Previous GENIA-7:   GENIA-7 SCORE 12/6/2017 12/11/2017 1/12/2018   Total Score 14 20 13       FRANNY LEVEL:  FRANNY Score (Last Two) 12/11/2017   FRANNY Raw Score 33   Activation Score 65.8   FRANNY Level 3       DATA  Extended Session (60+ minutes): No  Interactive Complexity: No  Crisis: No  [facility] Patient: No    Treatment Objective(s) Addressed in This Session:  Target Behavior(s): anxiety, blood sugar    Depressed Mood: Increase interest, engagement, and pleasure in doing things  Decrease frequency and intensity of feeling down, depressed, hopeless  Improve quantity and quality of night time sleep / decrease daytime naps  Feel less tired and more energy during the day   Improve diet, appetite, mindful eating, and / or meal planning  Identify negative self-talk and behaviors: challenge core beliefs, myths, and actions  Improve concentration, focus, and mindfulness in daily activities   Feel less fidgety, restless or slow in daily activities / interpersonal interactions  Anxiety: will experience a reduction in anxiety, will develop more effective coping skills to manage anxiety symptoms, will develop healthy cognitive patterns and beliefs and will increase ability to function adaptively    Current Stressors / Issues:  Pt reports that he is doing better. Not taken ativan. Pt is moving to [place]. Processed how  much better he feels that he maded the decision. Pt will continue therapy at [facility].      Progress on Treatment Objective(s) / Homework:  Stable - MAINTENANCE (Working to maintain change, with risk of relapse); Intervened by continuing to positively reinforce healthy behavior choice     Motivational Interviewing    MI Intervention: Expressed Empathy/Understanding, Permission to raise concern or advise and Open-ended questions     Change Talk Expressed by the Patient: Desire to change Ability to change Reasons to change    Provider Response to Change Talk: E - Evoked more info from patient about behavior change      Situation        Automatic Thoughts  Cognitive Distortions      Feelings        Behavior        Questioning Thoughts                  Care Plan review completed: Yes    Medication Review:  No changes to current psychiatric medication(s)    Medication Compliance:  Yes    Changes in Health Issues:   None reported    Chemical Use Review:   Substance Use: Chemical use reviewed, no active concerns identified      Tobacco Use: No current tobacco use.      Assessment: Current Emotional / Mental Status (status of significant symptoms):  Risk status (Self / Other harm or suicidal ideation)  Patient denies a history of suicidal ideation, suicide attempts, self-injurious behavior, homicidal ideation, homicidal behavior and and other safety concerns  Patient denies current fears or concerns for personal safety.  Patient reports the following current or recent suicidal ideation or behaviors: thoughts, but no plans. Will talk to kelvin Pink if this changes. .Safety Numbers were given again to pt.   Patient denies current or recent homicidal ideation or behaviors.  Patient denies current or recent self injurious behavior or ideation.  Patient denies other safety concerns.  A safety and risk management plan has been developed including: Patient consented to co-developed safety plan.  A safety and risk management plan  was completed.  Patient agreed to use safety plan should any safety concerns arise.  A copy was given to the patient.  Collaborative care team was informed of patient's risk status and plan.  If you are in immediate danger, call 911.  If you or someone you know is experiencing a mental health crisis and you need help, the following crisis  hotlines are available to help.    Crisis Connection  [phone] TDD  Toll Free [place] [phone]  [facility]    [facility] Emergency Department  Behavioral Emergency Center  [address] [place] [address]  [phone]  [facility] Crisis Response Services  (Adults & Children)  [phone]  [facility] -  Acute Psychiatric Services (APS)  Assessment & Referral: [phone]  Suicide Hotline: [phone]    [facility] Crisis Team  [phone]    [facility] Adult Mental Health Services   [phone]  Referrals: [phone]  Crisis: [phone]    [facility]    Emergency Department  [address] [place] [place] [address]  [phone]  [place] [employer]    [phone] ([phone])  [facility] Crisis Response Unit    [phone]  [facility]  Community Outreach for Psychiatric Emergencies  [phone]  [facility]  [facility] Adult Mental Health Services - Crisis Services (24/7)  Information & Referrals: [phone]  Crisis Line: [phone]    [facility] (24/7)  [phone] TDD      Appearance:   Appropriate   Eye Contact:   Good   Psychomotor Behavior: Normal   Attitude:   Cooperative   Orientation:   All  Speech   Rate / Production: Normal    Volume:  Normal   Mood:    Anxious  Sad   Affect:    Appropriate   Thought Content:  Clear   Thought Form:  Coherent  Logical   Insight:    Good      Diagnoses:  No diagnosis found.    Collateral Reports Completed:  Routed note to Care Team Member(s) Doris Rodriguez      Plan: (Homework, other):  Patient was given information about behavioral services and encouraged to schedule a follow up appointment with the clinic [facility] in 1 week, transfer care due to [place] out of [place]. .  He was also given information about mental health symptoms and treatment options  and deep Breathing Strategies to practice when experiencing anxiety.  CD Recommendations: No indications of CD issues.  ANA Dominguez, [facility]      ______________________________________________________________________    Integrated Primary Care Behavioral Health Treatment Plan    Patient's Name: jB Benitez  YOB: 1980    Date: 12/28/17    DSM-V Diagnoses: 296.32 (F33.1) Major Depressive Disorder, Recurrent Episode, Moderate _ and With anxious distress or 300.02 (F41.1) Generalized Anxiety Disorder  Psychosocial / Contextual Factors: health, family, work  WHODAS: 30    Referral / Collaboration:  The following referral(s) will be initiated: seeing  pcp.    Anticipated number of session or this episode of care: 6-8      MeasurableTreatment Goal(s) related to diagnosis / functional impairment(s)  Goal 1: Patient will increase his coping techniques to help with anxiety    I will know I've met my goal when  Less worried.      Objective #A (Patient Action)    Patient will use at least 2 coping skills for anxiety management in the next 1 weeks.  Status: New - Date: 12/28/17     Intervention(s)  [facility] will assign homework use the mayda pacifica.    Objective #B  Patient will use relaxation strategies 2 times per day to reduce the physical symptoms of anxiety.  Status: New - Date: 12/28/17     Intervention(s)  [facility] will assign homework CBT and mindfullness skills to help.    Patient has reviewed and agreed to the above plan.      ANA Barrera  December 28, 2017

## 2018-03-02 NOTE — MR AVS SNAPSHOT
After Visit Summary   3/2/2018    Bj Benitez    MRN: [id number]           Patient Information     Date Of Birth          1980        Visit Information        Provider Department      3/2/2018 9:30 AM Suzanne Lazcano LMFT [facility]        Today's Diagnoses     GENIA (generalized anxiety disorder)    -  1    Moderate single current episode of major depressive disorder (H)           Follow-ups after your visit        Who to contact     If you have questions or need follow up information about today's clinic visit or your schedule please contact [facility] directly at [phone].  Normal or non-critical lab and imaging results will be communicated to you by [employer], letter or phone within 4 business days after the clinic has received the results. If you do not hear from us within 7 days, please contact the clinic through [employer] or phone. If you have a critical or abnormal lab result, we will notify you by phone as soon as possible.  Submit refill requests through [employer] or call your pharmacy and they will forward the refill request to us. Please allow 3 business days for your refill to be completed.          Additional Information About Your Visit        MyChart Information     [employer] gives you secure access to your electronic health record. If you see a primary care provider, you can also send messages to your care team and make appointments. If you have questions, please call your primary care clinic.  If you do not have a primary care provider, please call [phone] and they will assist you.        Care EveryWhere ID     This is your Care EveryWhere ID. This could be used by other organizations to access your [facility] medical records  [id number]         Blood Pressure from Last 3 Encounters:   01/12/18 114/78   12/11/17 124/82   12/08/17 135/82    Weight from Last 3 Encounters:   01/12/18 99.3 kg (218 lb 14.4 oz)   12/11/17 100.8 kg (222 lb 3.2 oz)    12/06/17 103 kg (227 lb 1.6 oz)              Today, you had the following     No orders found for display       Primary Care Provider Office Phone # Fax #    Joe Bragg [phone]       [address] [facility] [address]        Equal Access to Services     [facility] : Hadii aad ku hadasho Soomaali, waaxda luqadaha, qaybta kaalmada adeegyada, waxay idiin hayaan adeeg yelena lamarisoln . So [facility] [phone].    ATENCIÓN: Si habla español, tiene a arizmendi disposición servicios gratuitos de asistencia lingüística. Llame al [phone].    We comply with applicable federal civil rights laws and [place] laws. We do not discriminate on the basis of race, color, national origin, age, disability, sex, sexual orientation, or gender identity.            Thank you!     Thank you for choosing [facility]  for your care. Our goal is always to provide you with excellent care. Hearing back from our patients is one way we can continue to improve our services. Please take a few minutes to complete the written survey that you may receive in the mail after your visit with us. Thank you!             Your Updated Medication List - Protect others around you: Learn how to safely use, store and throw away your medicines at www.disposemymeds.org.          This list is accurate as of 3/2/18 11:01 AM.  Always use your most recent med list.                   Brand Name Dispense Instructions for use Diagnosis    LORazepam 1 MG tablet    ATIVAN    20 tablet    Take 1 tablet (1 mg) by mouth every 8 hours as needed for anxiety    Anxiety       propranolol 10 MG tablet    INDERAL    30 tablet    Take 1 tablet (10 mg) by mouth 3 times daily as needed Anxiety    GENIA (generalized anxiety disorder)       QUEtiapine 25 MG tablet    SEROQUEL    15 tablet    Take 1-2 tablets (25-50 mg) by mouth nightly as needed        * sertraline 50 MG tablet    ZOLOFT    60 tablet    Take 2 tablets (100 mg) by mouth  daily    GENIA (generalized anxiety disorder), Adjustment disorder with depressed mood       * sertraline 50 MG tablet    ZOLOFT    30 tablet    TAKE 1 TABLET (50 MG) BY MOUTH DAILY    GENIA (generalized anxiety disorder), Adjustment disorder with depressed mood       traZODone 50 MG tablet    DESYREL     Take 50 mg by mouth At Bedtime        * Notice:  This list has 2 medication(s) that are the same as other medications prescribed for you. Read the directions carefully, and ask your doctor or other care provider to review them with you.

## 2018-03-14 DIAGNOSIS — F41.1 GAD (GENERALIZED ANXIETY DISORDER): ICD-10-CM

## 2018-03-14 DIAGNOSIS — F43.21 ADJUSTMENT DISORDER WITH DEPRESSED MOOD: ICD-10-CM

## 2018-03-19 NOTE — TELEPHONE ENCOUNTER
"Requested Prescriptions   Pending Prescriptions Disp Refills     sertraline (ZOLOFT) 50 MG tablet [Pharmacy Med Name: SERTRALINE HCL 50 MG TABLET] 60 tablet 1     Sig: TAKE 2 TABLETS (100 MG) BY MOUTH DAILY    SSRIs Protocol Passed    3/14/2018  2:00 AM       Passed - Recent (12 mo) or future (30 days) visit within the authorizing provider's specialty    Patient had office visit in the last 12 months or has a visit in the next 30 days with authorizing provider or within the authorizing provider's specialty.  See \"Patient Info\" tab in inbasket, or \"Choose Columns\" in Meds & Orders section of the refill encounter.           Passed - Patient is age 18 or older          PHQ-9 score:    PHQ-9 SCORE 12/11/2017   Total Score 11       Routing refill request to provider for review/approval because:  Recent phq9 outside SO parameters.    Please advise, thanks.                " impaired balance/decreased ROM/decreased strength/pain/decreased flexibility

## 2019-08-19 ENCOUNTER — TELEPHONE (OUTPATIENT)
Dept: INTERNAL MEDICINE | Facility: CLINIC | Age: 39
End: 2019-08-19

## 2019-08-19 NOTE — TELEPHONE ENCOUNTER
Panel Management Review      Patient has the following on his problem list:     Depression / Dysthymia review    Measure:  Needs PHQ-9 score of 4 or less during index window.  Administer PHQ-9 and if score is 5 or more, send encounter to provider for next steps.    5 - 7 month window range: Phq9    PHQ-9 SCORE 5/4/2017 12/6/2017 12/11/2017   PHQ-9 Total Score 5 8 11       If PHQ-9 recheck is 5 or more, route to provider for next steps.    Patient is due for:  PHQ9      Composite cancer screening  Chart review shows that this patient is due/due soon for the following None  Summary:    Patient is due/failing the following:   PHQ9    Action needed:   Patient needs to do PHQ9.    Type of outreach:    Sent [employer] message.    Questions for provider review:    None                                                                                                                                    RENA YEE CMA       Chart routed to no one .

## 2019-09-25 ENCOUNTER — TELEPHONE (OUTPATIENT)
Dept: BEHAVIORAL HEALTH | Facility: CLINIC | Age: 39
End: 2019-09-25

## 2019-09-25 NOTE — TELEPHONE ENCOUNTER
"\"We are in a place that God has opened up doors for us, but he is struggling with building relationships again.\" She is worried about things going back to where it was. I asked if she had any safety concerns  \"She said I have no concerns; I honestly haven't had to think about that with him recently.\" She is worried about how to talk to him about how people feel about him. \"I can't get him into see anyone by this weekend and I need advice.\" I explained that I have not seen him for so long; I could not give her advice. She stated she understood.  Encouraged her to call her insurance to see who they could see and if he needs to be seen right away she needs to take him in. She agreed.   "

## 2019-09-25 NOTE — TELEPHONE ENCOUNTER
----- Message from Magalis Aguilera sent at 9/25/2019  3:13 PM CDT -----  Regarding: Question from wife Apryl re: Emmanuel  Please call Apryl re: her  at   [phone]    She did not disclose why.     Thanks

## 2020-03-10 ENCOUNTER — HEALTH MAINTENANCE LETTER (OUTPATIENT)
Age: 40
End: 2020-03-10

## 2020-12-27 ENCOUNTER — HEALTH MAINTENANCE LETTER (OUTPATIENT)
Age: 40
End: 2020-12-27

## 2021-04-24 ENCOUNTER — HEALTH MAINTENANCE LETTER (OUTPATIENT)
Age: 41
End: 2021-04-24

## 2021-10-09 ENCOUNTER — HEALTH MAINTENANCE LETTER (OUTPATIENT)
Age: 41
End: 2021-10-09

## 2022-05-21 ENCOUNTER — HEALTH MAINTENANCE LETTER (OUTPATIENT)
Age: 42
End: 2022-05-21

## 2022-09-11 ENCOUNTER — HEALTH MAINTENANCE LETTER (OUTPATIENT)
Age: 42
End: 2022-09-11

## 2023-06-03 ENCOUNTER — HEALTH MAINTENANCE LETTER (OUTPATIENT)
Age: 43
End: 2023-06-03